# Patient Record
Sex: MALE | Race: OTHER | HISPANIC OR LATINO | URBAN - METROPOLITAN AREA
[De-identification: names, ages, dates, MRNs, and addresses within clinical notes are randomized per-mention and may not be internally consistent; named-entity substitution may affect disease eponyms.]

---

## 2022-05-02 ENCOUNTER — EMERGENCY (EMERGENCY)
Facility: HOSPITAL | Age: 72
LOS: 1 days | Discharge: HOME | End: 2022-05-02
Attending: EMERGENCY MEDICINE | Admitting: EMERGENCY MEDICINE
Payer: MEDICARE

## 2022-05-02 VITALS
RESPIRATION RATE: 14 BRPM | SYSTOLIC BLOOD PRESSURE: 145 MMHG | OXYGEN SATURATION: 100 % | TEMPERATURE: 98 F | HEART RATE: 67 BPM | DIASTOLIC BLOOD PRESSURE: 50 MMHG

## 2022-05-02 PROCEDURE — 93010 ELECTROCARDIOGRAM REPORT: CPT

## 2022-05-02 PROCEDURE — 99285 EMERGENCY DEPT VISIT HI MDM: CPT | Mod: 25

## 2022-05-02 NOTE — ED ADULT TRIAGE NOTE - CHIEF COMPLAINT QUOTE
presents C/O lower abdominal pain dysuria x10 days. No complaints of  hematuria. chest pain, headache, nausea, dizziness, vomiting  SOB, fever, chills verbalized. PMhx DM

## 2022-05-03 VITALS
TEMPERATURE: 99 F | OXYGEN SATURATION: 100 % | HEART RATE: 53 BPM | SYSTOLIC BLOOD PRESSURE: 108 MMHG | RESPIRATION RATE: 16 BRPM | DIASTOLIC BLOOD PRESSURE: 59 MMHG

## 2022-05-03 DIAGNOSIS — Z98.890 OTHER SPECIFIED POSTPROCEDURAL STATES: Chronic | ICD-10-CM

## 2022-05-03 LAB
ALBUMIN SERPL ELPH-MCNC: 3.6 G/DL — SIGNIFICANT CHANGE UP (ref 3.3–5)
ALP SERPL-CCNC: 79 U/L — SIGNIFICANT CHANGE UP (ref 40–120)
ALT FLD-CCNC: 19 U/L — SIGNIFICANT CHANGE UP (ref 4–41)
ANION GAP SERPL CALC-SCNC: 12 MMOL/L — SIGNIFICANT CHANGE UP (ref 7–14)
APPEARANCE UR: CLEAR — SIGNIFICANT CHANGE UP
AST SERPL-CCNC: 20 U/L — SIGNIFICANT CHANGE UP (ref 4–40)
BASOPHILS # BLD AUTO: 0.03 K/UL — SIGNIFICANT CHANGE UP (ref 0–0.2)
BASOPHILS NFR BLD AUTO: 0.5 % — SIGNIFICANT CHANGE UP (ref 0–2)
BILIRUB SERPL-MCNC: 0.5 MG/DL — SIGNIFICANT CHANGE UP (ref 0.2–1.2)
BILIRUB UR-MCNC: NEGATIVE — SIGNIFICANT CHANGE UP
BLOOD GAS VENOUS COMPREHENSIVE RESULT: SIGNIFICANT CHANGE UP
BUN SERPL-MCNC: 16 MG/DL — SIGNIFICANT CHANGE UP (ref 7–23)
CALCIUM SERPL-MCNC: 8.7 MG/DL — SIGNIFICANT CHANGE UP (ref 8.4–10.5)
CHLORIDE SERPL-SCNC: 104 MMOL/L — SIGNIFICANT CHANGE UP (ref 98–107)
CO2 SERPL-SCNC: 23 MMOL/L — SIGNIFICANT CHANGE UP (ref 22–31)
COLOR SPEC: YELLOW — SIGNIFICANT CHANGE UP
CREAT SERPL-MCNC: 0.99 MG/DL — SIGNIFICANT CHANGE UP (ref 0.5–1.3)
DIFF PNL FLD: NEGATIVE — SIGNIFICANT CHANGE UP
EGFR: 80 ML/MIN/1.73M2 — SIGNIFICANT CHANGE UP
EOSINOPHIL # BLD AUTO: 0.98 K/UL — HIGH (ref 0–0.5)
EOSINOPHIL NFR BLD AUTO: 17 % — HIGH (ref 0–6)
GLUCOSE SERPL-MCNC: 129 MG/DL — HIGH (ref 70–99)
GLUCOSE UR QL: NEGATIVE — SIGNIFICANT CHANGE UP
HCT VFR BLD CALC: 42 % — SIGNIFICANT CHANGE UP (ref 39–50)
HGB BLD-MCNC: 14.7 G/DL — SIGNIFICANT CHANGE UP (ref 13–17)
IANC: 1.87 K/UL — SIGNIFICANT CHANGE UP (ref 1.8–7.4)
IMM GRANULOCYTES NFR BLD AUTO: 0.5 % — SIGNIFICANT CHANGE UP (ref 0–1.5)
KETONES UR-MCNC: NEGATIVE — SIGNIFICANT CHANGE UP
LEUKOCYTE ESTERASE UR-ACNC: NEGATIVE — SIGNIFICANT CHANGE UP
LIDOCAIN IGE QN: 20 U/L — SIGNIFICANT CHANGE UP (ref 7–60)
LYMPHOCYTES # BLD AUTO: 2.03 K/UL — SIGNIFICANT CHANGE UP (ref 1–3.3)
LYMPHOCYTES # BLD AUTO: 35.2 % — SIGNIFICANT CHANGE UP (ref 13–44)
MCHC RBC-ENTMCNC: 33.9 PG — SIGNIFICANT CHANGE UP (ref 27–34)
MCHC RBC-ENTMCNC: 35 GM/DL — SIGNIFICANT CHANGE UP (ref 32–36)
MCV RBC AUTO: 97 FL — SIGNIFICANT CHANGE UP (ref 80–100)
MONOCYTES # BLD AUTO: 0.82 K/UL — SIGNIFICANT CHANGE UP (ref 0–0.9)
MONOCYTES NFR BLD AUTO: 14.2 % — HIGH (ref 2–14)
NEUTROPHILS # BLD AUTO: 1.87 K/UL — SIGNIFICANT CHANGE UP (ref 1.8–7.4)
NEUTROPHILS NFR BLD AUTO: 32.6 % — LOW (ref 43–77)
NITRITE UR-MCNC: NEGATIVE — SIGNIFICANT CHANGE UP
NRBC # BLD: 0 /100 WBCS — SIGNIFICANT CHANGE UP
NRBC # FLD: 0 K/UL — SIGNIFICANT CHANGE UP
NT-PROBNP SERPL-SCNC: 149 PG/ML — SIGNIFICANT CHANGE UP
PH UR: 6 — SIGNIFICANT CHANGE UP (ref 5–8)
PLATELET # BLD AUTO: 160 K/UL — SIGNIFICANT CHANGE UP (ref 150–400)
POTASSIUM SERPL-MCNC: 4 MMOL/L — SIGNIFICANT CHANGE UP (ref 3.5–5.3)
POTASSIUM SERPL-SCNC: 4 MMOL/L — SIGNIFICANT CHANGE UP (ref 3.5–5.3)
PROT SERPL-MCNC: 6.8 G/DL — SIGNIFICANT CHANGE UP (ref 6–8.3)
PROT UR-MCNC: ABNORMAL
RBC # BLD: 4.33 M/UL — SIGNIFICANT CHANGE UP (ref 4.2–5.8)
RBC # FLD: 12.5 % — SIGNIFICANT CHANGE UP (ref 10.3–14.5)
SARS-COV-2 RNA SPEC QL NAA+PROBE: SIGNIFICANT CHANGE UP
SODIUM SERPL-SCNC: 139 MMOL/L — SIGNIFICANT CHANGE UP (ref 135–145)
SP GR SPEC: 1.03 — SIGNIFICANT CHANGE UP (ref 1–1.05)
TROPONIN T, HIGH SENSITIVITY RESULT: 9 NG/L — SIGNIFICANT CHANGE UP
UROBILINOGEN FLD QL: ABNORMAL
WBC # BLD: 5.76 K/UL — SIGNIFICANT CHANGE UP (ref 3.8–10.5)
WBC # FLD AUTO: 5.76 K/UL — SIGNIFICANT CHANGE UP (ref 3.8–10.5)

## 2022-05-03 PROCEDURE — 71045 X-RAY EXAM CHEST 1 VIEW: CPT | Mod: 26

## 2022-05-03 PROCEDURE — 74177 CT ABD & PELVIS W/CONTRAST: CPT | Mod: 26,MA

## 2022-05-03 RX ORDER — METRONIDAZOLE 500 MG
1 TABLET ORAL
Qty: 21 | Refills: 0
Start: 2022-05-03 | End: 2022-05-09

## 2022-05-03 RX ORDER — CEFTRIAXONE 500 MG/1
1000 INJECTION, POWDER, FOR SOLUTION INTRAMUSCULAR; INTRAVENOUS ONCE
Refills: 0 | Status: COMPLETED | OUTPATIENT
Start: 2022-05-03 | End: 2022-05-03

## 2022-05-03 RX ORDER — METRONIDAZOLE 500 MG
500 TABLET ORAL ONCE
Refills: 0 | Status: COMPLETED | OUTPATIENT
Start: 2022-05-03 | End: 2022-05-03

## 2022-05-03 RX ORDER — SODIUM CHLORIDE 9 MG/ML
1000 INJECTION INTRAMUSCULAR; INTRAVENOUS; SUBCUTANEOUS ONCE
Refills: 0 | Status: COMPLETED | OUTPATIENT
Start: 2022-05-03 | End: 2022-05-03

## 2022-05-03 RX ORDER — CEFPODOXIME PROXETIL 100 MG
1 TABLET ORAL
Qty: 14 | Refills: 0
Start: 2022-05-03 | End: 2022-05-09

## 2022-05-03 RX ORDER — ONDANSETRON 8 MG/1
4 TABLET, FILM COATED ORAL ONCE
Refills: 0 | Status: COMPLETED | OUTPATIENT
Start: 2022-05-03 | End: 2022-05-03

## 2022-05-03 RX ORDER — FAMOTIDINE 10 MG/ML
20 INJECTION INTRAVENOUS ONCE
Refills: 0 | Status: COMPLETED | OUTPATIENT
Start: 2022-05-03 | End: 2022-05-03

## 2022-05-03 RX ADMIN — ONDANSETRON 4 MILLIGRAM(S): 8 TABLET, FILM COATED ORAL at 02:01

## 2022-05-03 RX ADMIN — FAMOTIDINE 20 MILLIGRAM(S): 10 INJECTION INTRAVENOUS at 02:01

## 2022-05-03 RX ADMIN — CEFTRIAXONE 100 MILLIGRAM(S): 500 INJECTION, POWDER, FOR SOLUTION INTRAMUSCULAR; INTRAVENOUS at 03:20

## 2022-05-03 RX ADMIN — SODIUM CHLORIDE 1000 MILLILITER(S): 9 INJECTION INTRAMUSCULAR; INTRAVENOUS; SUBCUTANEOUS at 01:19

## 2022-05-03 RX ADMIN — Medication 100 MILLIGRAM(S): at 06:46
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
## 2022-05-03 NOTE — ED PROVIDER NOTE - NSFOLLOWUPINSTRUCTIONS_ED_ALL_ED_FT
Rest and stay well hydrated.     Follow-up with your PMD this week for recheck - call to discuss.     Follow-up with Surgery and Gastroenterology for further evaluation of your diverticulitis.     Take antibiotics as prescribed - see medication warnings.     Finish your course of ciprofloxacin as directed.     Return to the ED for any worsening abdominal pain, blood in stool, vomiting, fever, or any new concerning symptoms.
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
## 2022-05-03 NOTE — ED PROVIDER NOTE - NSFOLLOWUPCLINICSTOKEN_GEN_ALL_ED_FT
[id number]: || ||00\01||False;[id number]: || ||00\01||False;[id number]: || ||00\01||False;[id number]: || ||00\01||False;[id number]: || ||00\01||False;[id number]: || ||00\01||False;[id number]: || ||00\01||False;

## 2022-05-03 NOTE — ED PROVIDER NOTE - CARE PLAN
1 Principal Discharge DX:	Abdominal pain   Principal Discharge DX:	Acute UTI  Secondary Diagnosis:	Diverticulitis

## 2022-05-03 NOTE — ED ADULT NURSE NOTE - OBJECTIVE STATEMENT
Patient came in with the complaints of intermittent Lower abdominal pain with dysuria X 10 days. Patient denies hematuria. No other complaints. Specimens collected and sent. No distress noted. Nursing care continues

## 2022-05-03 NOTE — ED PROVIDER NOTE - PATIENT PORTAL LINK FT
You can access the FollowMyHealth Patient Portal offered by [facility] by registering at the following website: [email or web address]. By joining [employer]’s FollowMyHealth portal, you will also be able to view your health information using other applications (apps) compatible with our system.

## 2022-05-03 NOTE — ED PROVIDER NOTE - PROGRESS NOTE DETAILS
Ford, PGY3 - CT w/ mild sigmoid diverticulitis. pt reassessed, states feels better, discussed labs and imaging results w/ pt and daughter - plan for dc home, f/u w/ GI and surgery, antibiotics, return precautions. both agree with plan all questions answered

## 2022-05-03 NOTE — ED PROVIDER NOTE - PHYSICAL EXAMINATION
Gen: well developed male NAD   HEENT: NCAT, EOMI, no nasal discharge, mucous membranes moist  CV: RRR, +S1/S2, no M/R/G  Resp: CTAB, no W/R/R  GI: Abdomen soft non-distended +TTP LLQ no rebound rigidity guarding or masses   : no erythema edema or asymmetry to b/l testicles no lesions or masses no rash to penis   MSK: No open wounds, no bruising, no LE edema  Neuro: A&Ox4, following commands, moving all four extremities spontaneously  Psych: appropriate mood

## 2022-05-03 NOTE — ED PROVIDER NOTE - NSFOLLOWUPCLINICS_GEN_ALL_ED_FT
A Gastroenterologist  Gastroenterology  .  [place]   Phone:   Fax:     A General Surgeon  General Surgery  .  [place]   Phone:   Fax:     [facility] - Primary Care  Primary Care  [address] [place] [address]  Phone: [phone]  Fax:     [facility] Gastroenterology  Gastroenterology  [address], [address]  [place] [address]  Phone: [phone]  Fax:     [facility] Specialty Clinics  General Surgery  [address] [place] [address]  Phone: [phone]  Fax:     An ENT Doctor  Otolaryngology (ENT)  .  [place]   Phone:   Fax:     [facility] - ENT  Otolaryngology (ENT)  [address] [place] [address]  Phone: [phone]  Fax:

## 2022-05-04 LAB
CULTURE RESULTS: SIGNIFICANT CHANGE UP
SPECIMEN SOURCE: SIGNIFICANT CHANGE UP

## 2022-05-05 NOTE — ED POST DISCHARGE NOTE - REASON FOR FOLLOW-UP
Other Received a call from [facility] asking for patient's COVID-19 results which were negative.

## 2022-07-27 PROBLEM — E11.9 TYPE 2 DIABETES MELLITUS WITHOUT COMPLICATIONS: Chronic | Status: ACTIVE | Noted: 2022-05-03

## 2022-07-28 PROBLEM — Z00.00 ENCOUNTER FOR PREVENTIVE HEALTH EXAMINATION: Status: ACTIVE | Noted: 2022-07-28

## 2022-08-01 ENCOUNTER — APPOINTMENT (OUTPATIENT)
Dept: NEUROSURGERY | Facility: CLINIC | Age: 72
End: 2022-08-01

## 2022-08-01 DIAGNOSIS — R13.10 DYSPHAGIA, UNSPECIFIED: ICD-10-CM

## 2022-08-01 DIAGNOSIS — R13.12 DYSPHAGIA, OROPHARYNGEAL PHASE: ICD-10-CM

## 2022-08-01 DIAGNOSIS — E78.5 HYPERLIPIDEMIA, UNSPECIFIED: ICD-10-CM

## 2022-08-01 DIAGNOSIS — S39.91XA UNSPECIFIED INJURY OF ABDOMEN, INITIAL ENCOUNTER: ICD-10-CM

## 2022-08-01 DIAGNOSIS — M25.78 OSTEOPHYTE, VERTEBRAE: ICD-10-CM

## 2022-08-01 DIAGNOSIS — Z86.79 PERSONAL HISTORY OF OTHER DISEASES OF THE CIRCULATORY SYSTEM: ICD-10-CM

## 2022-08-01 DIAGNOSIS — Z78.9 OTHER SPECIFIED HEALTH STATUS: ICD-10-CM

## 2022-08-01 PROCEDURE — 99204 OFFICE O/P NEW MOD 45 MIN: CPT

## 2022-08-04 NOTE — REASON FOR VISIT
[New Patient Visit] : a new patient visit [Referred By: _________] : Patient was referred by RAFIA [Interpreters_IDNumber] : [id number] [Interpreters_FullName] : CHIARA [TWNoteComboBox1] : Micronesian

## 2022-08-04 NOTE — ASSESSMENT
[FreeTextEntry1] : Mr. London Farias is a 72 Year old Man presenting with Cervical osteophytes and dysphagia\par Dr. Larkin explained that pt has advance arthritis that is compression the esophagus causing difficulty swallowing\par Recommend to see SLP to improve symptoms without surgical interventions.\par Follow up with  regarding Left hearing issues.\par \par Please see Dr. Larkin's dictation for details.\par I, Dr. César Larkin evaluated the patient with the nurse practitioner Christiano Mcallister and established the plan of care. I personally discuss this patient with the nurse practitioner at the time of the visit. I agree with the assessment and plan as written, unless noted below.\par \par

## 2022-08-04 NOTE — PHYSICAL EXAM
[General Appearance - Alert] : alert [General Appearance - In No Acute Distress] : in no acute distress [Oriented To Time, Place, And Person] : oriented to person, place, and time [Impaired Insight] : insight and judgment were intact [No Visual Abnormalities] : no visible abnormalities [Sclera] : the sclera and conjunctiva were normal [Neck Appearance] : the appearance of the neck was normal [] : no respiratory distress [Heart Rate And Rhythm] : heart rate was normal and rhythm regular [Abnormal Walk] : normal gait [FreeTextEntry1] : Swallowing difficulty

## 2022-08-04 NOTE — HISTORY OF PRESENT ILLNESS
[> 3 months] : more  than 3 months [FreeTextEntry1] : Difficulty swallowing X 1 year [de-identified] : Mr. London Farias is a 72  year old Swedish speaking man presenting with PMHx of HTN, dyslipidemia, dysphasia  who presents for evaluation of cervical spine. He comes for comprehensive neurosurgical evaluation after comprehensive workup. He underwent comprehensive ENT workup. He reports Neck pain on the right side worse in morning. He denies any hand weakness hearing loss left side. He reports significant intense neck pain occurring several times per month. He reports ongoing swallowing difficulty. He denies any weakness in his upper extremities.

## 2023-10-01 ENCOUNTER — EMERGENCY (EMERGENCY)
Facility: HOSPITAL | Age: 73
LOS: 1 days | Discharge: HOME | End: 2023-10-01
Attending: STUDENT IN AN ORGANIZED HEALTH CARE EDUCATION/TRAINING PROGRAM
Payer: COMMERCIAL

## 2023-10-01 VITALS
HEIGHT: 65 IN | DIASTOLIC BLOOD PRESSURE: 75 MMHG | TEMPERATURE: 98 F | RESPIRATION RATE: 20 BRPM | OXYGEN SATURATION: 97 % | HEART RATE: 68 BPM | SYSTOLIC BLOOD PRESSURE: 154 MMHG | WEIGHT: 156.09 LBS

## 2023-10-01 DIAGNOSIS — Z98.890 OTHER SPECIFIED POSTPROCEDURAL STATES: Chronic | ICD-10-CM

## 2023-10-01 PROCEDURE — 99284 EMERGENCY DEPT VISIT MOD MDM: CPT

## 2023-10-01 PROCEDURE — 96372 THER/PROPH/DIAG INJ SC/IM: CPT

## 2023-10-01 PROCEDURE — 99283 EMERGENCY DEPT VISIT LOW MDM: CPT | Mod: 25

## 2023-10-01 RX ORDER — LIDOCAINE 4 G/100G
1 CREAM TOPICAL ONCE
Refills: 0 | Status: COMPLETED | OUTPATIENT
Start: 2023-10-01 | End: 2023-10-01

## 2023-10-01 RX ORDER — ACETAMINOPHEN 500 MG
975 TABLET ORAL ONCE
Refills: 0 | Status: COMPLETED | OUTPATIENT
Start: 2023-10-01 | End: 2023-10-01

## 2023-10-01 RX ORDER — KETOROLAC TROMETHAMINE 30 MG/ML
30 SYRINGE (ML) INJECTION ONCE
Refills: 0 | Status: DISCONTINUED | OUTPATIENT
Start: 2023-10-01 | End: 2023-10-01

## 2023-10-01 RX ORDER — CYCLOBENZAPRINE HYDROCHLORIDE 10 MG/1
10 TABLET, FILM COATED ORAL ONCE
Refills: 0 | Status: COMPLETED | OUTPATIENT
Start: 2023-10-01 | End: 2023-10-01

## 2023-10-01 RX ADMIN — Medication 975 MILLIGRAM(S): at 17:49

## 2023-10-01 RX ADMIN — LIDOCAINE 1 PATCH: 4 CREAM TOPICAL at 17:49

## 2023-10-01 RX ADMIN — Medication 30 MILLIGRAM(S): at 17:49

## 2023-10-01 RX ADMIN — CYCLOBENZAPRINE HYDROCHLORIDE 10 MILLIGRAM(S): 10 TABLET, FILM COATED ORAL at 17:49

## 2023-10-01 NOTE — ED PROVIDER NOTE - CLINICAL SUMMARY MEDICAL DECISION MAKING FREE TEXT BOX
73M presenting with lower back pain. no red flag symptoms, no midline spinal tenderness. had trauma prior to symptoms beginning. low concern for cauda equina syndrome, spinal lesions. likely msk pain vs radiculopathy. will provide analgesia.

## 2023-10-01 NOTE — ED PROVIDER NOTE - PATIENT PORTAL LINK FT
You can access the FollowMyHealth Patient Portal offered by [facility] by registering at the following website: [email or web address]. By joining [email or web address]’s FollowMyHealth portal, you will also be able to view your health information using other applications (apps) compatible with our system.

## 2023-10-01 NOTE — ED PROVIDER NOTE - NSFOLLOWUPINSTRUCTIONS_ED_ALL_ED_FT
You were seen in the emergency department for lower back pain.     Please follow-up with your primary care doctor in the next 24-48 hours.     Please take Ibuprofen and tylenol as prescribed on the bottles for pain control.     If you have any worsening symptoms, severe back pain, leg weakness, numbness or tingling please return to the emergency department. Le atendieron en urgencias por dolor lumbar.    Chery un seguimiento con valienet médico de atención primaria en las próximas 24 a 48 horas.    McCord ibuprofeno y tylenol según lo prescrito en los frascos para controlar el dolor.    Si tiene algún síntoma que empeora, dolor de espalda intenso, debilidad en las piernas, entumecimiento u hormigueo, regrese al departamento de emergencias.    Translation via Google Translate. Cannot guarantee accuracy.     You were seen in the emergency department for lower back pain.     Please follow-up with your primary care doctor in the next 24-48 hours.     Please take Ibuprofen and tylenol as prescribed on the bottles for pain control.     If you have any worsening symptoms, severe back pain, leg weakness, numbness or tingling please return to the emergency department.

## 2023-10-01 NOTE — ED ADULT NURSE NOTE - NSFALLUNIVINTERV_ED_ALL_ED
Bed/Stretcher in lowest position, wheels locked, appropriate side rails in place/Call bell, personal items and telephone in reach/Instruct patient to call for assistance before getting out of bed/chair/stretcher/Non-slip footwear applied when patient is off stretcher/[place] to call system/Physically safe environment - no spills, clutter or unnecessary equipment/Purposeful proactive rounding/Room/bathroom lighting operational, light cord in reach

## 2023-10-01 NOTE — ED ADULT NURSE NOTE - OBJECTIVE STATEMENT
pt is here for back pain.  pt stated that back pain x 20 days, difficulty ambulating, denies difficulty with urination or burning sensation, denies chest pain or sob,

## 2023-10-01 NOTE — ED PROVIDER NOTE - PHYSICAL EXAMINATION
General: well appearing male, no acute distress   HEENT: normocephalic, atraumatic   Respiratory: normal work of breathing  MSK: no midline spinal tenderness to palpation, right lumbar paraspinal tenderness to palpation   Skin: warm, dry   Neuro: A&Ox3, positive left straight leg raise   Psych: appropriate affect

## 2023-10-01 NOTE — ED PROVIDER NOTE - OBJECTIVE STATEMENT
73M, pmh of htn, DM, presenting with back pain. patient reports for the past two weeks he has had right sided back pain that is worse with movement. reports maybe a week prior he fell onto his back. denies any numbness, numbness or tingling in his legs.

## 2024-03-07 ENCOUNTER — INPATIENT (INPATIENT)
Facility: HOSPITAL | Age: 74
LOS: 2 days | Discharge: HOME HEALTH | End: 2024-03-10
Attending: HOSPITALIST | Admitting: HOSPITALIST
Payer: MEDICARE

## 2024-03-07 VITALS
SYSTOLIC BLOOD PRESSURE: 161 MMHG | RESPIRATION RATE: 18 BRPM | OXYGEN SATURATION: 97 % | HEART RATE: 72 BPM | TEMPERATURE: 98 F | DIASTOLIC BLOOD PRESSURE: 64 MMHG

## 2024-03-07 DIAGNOSIS — Z98.890 OTHER SPECIFIED POSTPROCEDURAL STATES: Chronic | ICD-10-CM

## 2024-03-07 LAB
ADD ON TEST-SPECIMEN IN LAB: SIGNIFICANT CHANGE UP
ALBUMIN SERPL ELPH-MCNC: 4 G/DL — SIGNIFICANT CHANGE UP (ref 3.3–5)
ALP SERPL-CCNC: 73 U/L — SIGNIFICANT CHANGE UP (ref 40–120)
ALT FLD-CCNC: 14 U/L — SIGNIFICANT CHANGE UP (ref 4–41)
ANION GAP SERPL CALC-SCNC: 13 MMOL/L — SIGNIFICANT CHANGE UP (ref 7–14)
AST SERPL-CCNC: 17 U/L — SIGNIFICANT CHANGE UP (ref 4–40)
BASOPHILS # BLD AUTO: 0.04 K/UL — SIGNIFICANT CHANGE UP (ref 0–0.2)
BASOPHILS NFR BLD AUTO: 0.4 % — SIGNIFICANT CHANGE UP (ref 0–2)
BILIRUB SERPL-MCNC: 1.1 MG/DL — SIGNIFICANT CHANGE UP (ref 0.2–1.2)
BUN SERPL-MCNC: 10 MG/DL — SIGNIFICANT CHANGE UP (ref 7–23)
CALCIUM SERPL-MCNC: 9 MG/DL — SIGNIFICANT CHANGE UP (ref 8.4–10.5)
CHLORIDE SERPL-SCNC: 99 MMOL/L — SIGNIFICANT CHANGE UP (ref 98–107)
CO2 SERPL-SCNC: 27 MMOL/L — SIGNIFICANT CHANGE UP (ref 22–31)
CREAT SERPL-MCNC: 0.96 MG/DL — SIGNIFICANT CHANGE UP (ref 0.5–1.3)
EGFR: 82 ML/MIN/1.73M2 — SIGNIFICANT CHANGE UP
EOSINOPHIL # BLD AUTO: 0.31 K/UL — SIGNIFICANT CHANGE UP (ref 0–0.5)
EOSINOPHIL NFR BLD AUTO: 2.8 % — SIGNIFICANT CHANGE UP (ref 0–6)
GLUCOSE SERPL-MCNC: 132 MG/DL — HIGH (ref 70–99)
HCT VFR BLD CALC: 43.1 % — SIGNIFICANT CHANGE UP (ref 39–50)
HGB BLD-MCNC: 15 G/DL — SIGNIFICANT CHANGE UP (ref 13–17)
IANC: 7.21 K/UL — SIGNIFICANT CHANGE UP (ref 1.8–7.4)
IMM GRANULOCYTES NFR BLD AUTO: 0.5 % — SIGNIFICANT CHANGE UP (ref 0–0.9)
LYMPHOCYTES # BLD AUTO: 19.8 % — SIGNIFICANT CHANGE UP (ref 13–44)
LYMPHOCYTES # BLD AUTO: 2.17 K/UL — SIGNIFICANT CHANGE UP (ref 1–3.3)
MCHC RBC-ENTMCNC: 33.2 PG — SIGNIFICANT CHANGE UP (ref 27–34)
MCHC RBC-ENTMCNC: 34.8 GM/DL — SIGNIFICANT CHANGE UP (ref 32–36)
MCV RBC AUTO: 95.4 FL — SIGNIFICANT CHANGE UP (ref 80–100)
MONOCYTES # BLD AUTO: 1.2 K/UL — HIGH (ref 0–0.9)
MONOCYTES NFR BLD AUTO: 10.9 % — SIGNIFICANT CHANGE UP (ref 2–14)
NEUTROPHILS # BLD AUTO: 7.21 K/UL — SIGNIFICANT CHANGE UP (ref 1.8–7.4)
NEUTROPHILS NFR BLD AUTO: 65.6 % — SIGNIFICANT CHANGE UP (ref 43–77)
NRBC # BLD: 0 /100 WBCS — SIGNIFICANT CHANGE UP (ref 0–0)
NRBC # FLD: 0 K/UL — SIGNIFICANT CHANGE UP (ref 0–0)
NT-PROBNP SERPL-SCNC: 180 PG/ML — SIGNIFICANT CHANGE UP
PLATELET # BLD AUTO: 141 K/UL — LOW (ref 150–400)
POTASSIUM SERPL-MCNC: 3.9 MMOL/L — SIGNIFICANT CHANGE UP (ref 3.5–5.3)
POTASSIUM SERPL-SCNC: 3.9 MMOL/L — SIGNIFICANT CHANGE UP (ref 3.5–5.3)
PROT SERPL-MCNC: 7.6 G/DL — SIGNIFICANT CHANGE UP (ref 6–8.3)
RBC # BLD: 4.52 M/UL — SIGNIFICANT CHANGE UP (ref 4.2–5.8)
RBC # FLD: 12.7 % — SIGNIFICANT CHANGE UP (ref 10.3–14.5)
SODIUM SERPL-SCNC: 139 MMOL/L — SIGNIFICANT CHANGE UP (ref 135–145)
TROPONIN T, HIGH SENSITIVITY RESULT: 13 NG/L — SIGNIFICANT CHANGE UP
WBC # BLD: 10.98 K/UL — HIGH (ref 3.8–10.5)
WBC # FLD AUTO: 10.98 K/UL — HIGH (ref 3.8–10.5)

## 2024-03-07 PROCEDURE — 99285 EMERGENCY DEPT VISIT HI MDM: CPT

## 2024-03-07 PROCEDURE — 71046 X-RAY EXAM CHEST 2 VIEWS: CPT | Mod: 26

## 2024-03-07 RX ORDER — ACETAMINOPHEN 500 MG
1000 TABLET ORAL ONCE
Refills: 0 | Status: COMPLETED | OUTPATIENT
Start: 2024-03-07 | End: 2024-03-07

## 2024-03-07 RX ORDER — FAMOTIDINE 10 MG/ML
20 INJECTION INTRAVENOUS ONCE
Refills: 0 | Status: COMPLETED | OUTPATIENT
Start: 2024-03-07 | End: 2024-03-07

## 2024-03-07 RX ORDER — IOHEXOL 300 MG/ML
30 INJECTION, SOLUTION INTRAVENOUS ONCE
Refills: 0 | Status: DISCONTINUED | OUTPATIENT
Start: 2024-03-07 | End: 2024-03-07

## 2024-03-07 RX ADMIN — FAMOTIDINE 20 MILLIGRAM(S): 10 INJECTION INTRAVENOUS at 22:08

## 2024-03-07 RX ADMIN — Medication 30 MILLILITER(S): at 22:07

## 2024-03-07 RX ADMIN — Medication 400 MILLIGRAM(S): at 21:36

## 2024-03-07 NOTE — ED ADULT NURSE NOTE - OBJECTIVE STATEMENT
Martinez RN: Pt arrives to room 2. Pt is A and Ox4 and ambulatory. HX: DM type 2, HTN, HLD. Pt arrives to the ED complaining of chest pain and abdominal pain for four days. Pt states that the pain does not travel and is a little worse on exertion. Pt denies shortness of breath, headaches, numbness and tingling, n/v, fever and chills. Airway is patent, respirations are even and unlabored. IV placed in the L AC. Pt medicated per MAR. Labs sent per provider orders. Report given to primary RN.

## 2024-03-07 NOTE — ED ADULT TRIAGE NOTE - CHIEF COMPLAINT QUOTE
Patient complains of left sided chest pain, neck pain and abdominal pain x4 days. Patient also complains of SOB and chills, denies fever, nausea, vomiting and constipation. Patient able to speak in clear sentences, respirations equal and unlabored. PMHx of DM2, hernia and HLD.

## 2024-03-07 NOTE — ED PROVIDER NOTE - PROGRESS NOTE DETAILS
Patient signed out to me, CT showing aortic thrombus.  Spoke to Dr. Lipscomb (cardiac surgery)  no indication for procedural or surgical intervention at this time given CT the patient's symptoms.  Recommended heparinization and admission to medicine.  Patient updated on findings.  Weight obtained. Will obtain head CT prior to starting heparin.   Patient complaining of persistent abdominal pain,  no actionable findings on CT.  Possible inflamed pancreas however lipase normal.  Will order for morphine.  Plan for admission. Fernandez Reed, ED Attending
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
## 2024-03-07 NOTE — ED PROVIDER NOTE - ATTENDING CONTRIBUTION TO CARE
73-year-old male past medical history hypertension, edema, diabetes, CVA presenting with daughter at bedside for chest and abdominal pain for the past 3 days.  Reports chest pain pressure-like constant nonradiating with associated shortness of breath.  Denies diaphoresis nausea vomiting.  Reports symptoms worse with exertion.  Also reports nonfocal abdominal pain without constipation, diarrhea, dysuria hematuria.  Denies any new back pain, extremity swelling, extremity pain, numbness, weakness.  Exam as above  Unclear etiology of symptoms, differential diagnosis includes, but not limited to, CAD, pneumonia, aortic pathology, colitis, bowel obstruction.  Labs, CTA, symptom relief, reassess.  Patient signed out at end of my shift.

## 2024-03-07 NOTE — ED PROVIDER NOTE - OBJECTIVE STATEMENT
73-year-old male with a past medical history of hypertension, hyperlipidemia, diabetes, CVA presenting with chest pain. Patient states he has had chest pain and abdominal pain for the last 3 days. Chest pain feels like pressure, constant, nonradiating. Associated shortness of breath, worse with exertion. Patient is also had generalized abdominal pain for the last 3 days as well. Denies nausea or vomiting, fevers.

## 2024-03-07 NOTE — ED ADULT TRIAGE NOTE - LANGUAGE ASSISTANCE NEEDED
DISCHARGE PLANNING     Discharge to home or other facility with appropriate resources Progressing        DISCHARGE PLANNING - CARE MANAGEMENT     Discharge to post-acute care or home with appropriate resources Progressing        INFECTION - ADULT     Absence or prevention of progression during hospitalization Progressing     Absence of fever/infection during neutropenic period Progressing        Knowledge Deficit     Patient/family/caregiver demonstrates understanding of disease process, treatment plan, medications, and discharge instructions Progressing        PAIN - ADULT     Verbalizes/displays adequate comfort level or baseline comfort level Progressing        POSTPARTUM     Experiences normal postpartum course Progressing     Appropriate maternal -  bonding Progressing     Establishment of infant feeding pattern Progressing     Incision(s), wounds(s) or drain site(s) healing without S/S of infection Progressing        SAFETY ADULT     Patient will remain free of falls Progressing     Maintain or return to baseline ADL function Progressing     Maintain or return mobility status to optimal level Progressing No-Patient/Caregiver offered and refused free interpretation services.

## 2024-03-08 DIAGNOSIS — I74.11 EMBOLISM AND THROMBOSIS OF THORACIC AORTA: ICD-10-CM

## 2024-03-08 DIAGNOSIS — R10.9 UNSPECIFIED ABDOMINAL PAIN: ICD-10-CM

## 2024-03-08 DIAGNOSIS — R07.9 CHEST PAIN, UNSPECIFIED: ICD-10-CM

## 2024-03-08 DIAGNOSIS — E11.9 TYPE 2 DIABETES MELLITUS WITHOUT COMPLICATIONS: ICD-10-CM

## 2024-03-08 DIAGNOSIS — E78.5 HYPERLIPIDEMIA, UNSPECIFIED: ICD-10-CM

## 2024-03-08 DIAGNOSIS — K29.70 GASTRITIS, UNSPECIFIED, WITHOUT BLEEDING: ICD-10-CM

## 2024-03-08 DIAGNOSIS — I63.9 CEREBRAL INFARCTION, UNSPECIFIED: ICD-10-CM

## 2024-03-08 DIAGNOSIS — I10 ESSENTIAL (PRIMARY) HYPERTENSION: ICD-10-CM

## 2024-03-08 DIAGNOSIS — Z29.9 ENCOUNTER FOR PROPHYLACTIC MEASURES, UNSPECIFIED: ICD-10-CM

## 2024-03-08 DIAGNOSIS — I74.10 EMBOLISM AND THROMBOSIS OF UNSPECIFIED PARTS OF AORTA: ICD-10-CM

## 2024-03-08 LAB
ALBUMIN SERPL ELPH-MCNC: 3.6 G/DL — SIGNIFICANT CHANGE UP (ref 3.3–5)
ALP SERPL-CCNC: 68 U/L — SIGNIFICANT CHANGE UP (ref 40–120)
ALT FLD-CCNC: 11 U/L — SIGNIFICANT CHANGE UP (ref 4–41)
AMYLASE P1 CFR SERPL: 52 U/L — SIGNIFICANT CHANGE UP (ref 25–125)
ANION GAP SERPL CALC-SCNC: 10 MMOL/L — SIGNIFICANT CHANGE UP (ref 7–14)
ANION GAP SERPL CALC-SCNC: 10 MMOL/L — SIGNIFICANT CHANGE UP (ref 7–14)
APPEARANCE UR: CLEAR — SIGNIFICANT CHANGE UP
APTT BLD: 28.8 SEC — SIGNIFICANT CHANGE UP (ref 24.5–35.6)
APTT BLD: 66.8 SEC — HIGH (ref 24.5–35.6)
APTT BLD: 75 SEC — HIGH (ref 24.5–35.6)
AST SERPL-CCNC: 14 U/L — SIGNIFICANT CHANGE UP (ref 4–40)
BACTERIA # UR AUTO: NEGATIVE /HPF — SIGNIFICANT CHANGE UP
BASE EXCESS BLDV CALC-SCNC: 5 MMOL/L — HIGH (ref -2–3)
BILIRUB SERPL-MCNC: 1.1 MG/DL — SIGNIFICANT CHANGE UP (ref 0.2–1.2)
BILIRUB UR-MCNC: NEGATIVE — SIGNIFICANT CHANGE UP
BLOOD GAS VENOUS COMPREHENSIVE RESULT: SIGNIFICANT CHANGE UP
BUN SERPL-MCNC: 12 MG/DL — SIGNIFICANT CHANGE UP (ref 7–23)
BUN SERPL-MCNC: 12 MG/DL — SIGNIFICANT CHANGE UP (ref 7–23)
CALCIUM SERPL-MCNC: 8.6 MG/DL — SIGNIFICANT CHANGE UP (ref 8.4–10.5)
CALCIUM SERPL-MCNC: 8.6 MG/DL — SIGNIFICANT CHANGE UP (ref 8.4–10.5)
CAST: 0 /LPF — SIGNIFICANT CHANGE UP (ref 0–4)
CHLORIDE BLDV-SCNC: 99 MMOL/L — SIGNIFICANT CHANGE UP (ref 96–108)
CHLORIDE SERPL-SCNC: 100 MMOL/L — SIGNIFICANT CHANGE UP (ref 98–107)
CHLORIDE SERPL-SCNC: 99 MMOL/L — SIGNIFICANT CHANGE UP (ref 98–107)
CO2 BLDV-SCNC: 33.1 MMOL/L — HIGH (ref 22–26)
CO2 SERPL-SCNC: 28 MMOL/L — SIGNIFICANT CHANGE UP (ref 22–31)
CO2 SERPL-SCNC: 29 MMOL/L — SIGNIFICANT CHANGE UP (ref 22–31)
COLOR SPEC: YELLOW — SIGNIFICANT CHANGE UP
CREAT SERPL-MCNC: 0.97 MG/DL — SIGNIFICANT CHANGE UP (ref 0.5–1.3)
CREAT SERPL-MCNC: 0.98 MG/DL — SIGNIFICANT CHANGE UP (ref 0.5–1.3)
DIFF PNL FLD: NEGATIVE — SIGNIFICANT CHANGE UP
EGFR: 80 ML/MIN/1.73M2 — SIGNIFICANT CHANGE UP
EGFR: 81 ML/MIN/1.73M2 — SIGNIFICANT CHANGE UP
GAS PNL BLDV: 133 MMOL/L — LOW (ref 136–145)
GLUCOSE BLDV-MCNC: 134 MG/DL — HIGH (ref 70–99)
GLUCOSE SERPL-MCNC: 119 MG/DL — HIGH (ref 70–99)
GLUCOSE SERPL-MCNC: 120 MG/DL — HIGH (ref 70–99)
GLUCOSE UR QL: NEGATIVE MG/DL — SIGNIFICANT CHANGE UP
HCO3 BLDV-SCNC: 32 MMOL/L — HIGH (ref 22–29)
HCT VFR BLD CALC: 42.8 % — SIGNIFICANT CHANGE UP (ref 39–50)
HCT VFR BLDA CALC: 48 % — SIGNIFICANT CHANGE UP (ref 39–51)
HGB BLD CALC-MCNC: 16.1 G/DL — SIGNIFICANT CHANGE UP (ref 12.6–17.4)
HGB BLD-MCNC: 15.1 G/DL — SIGNIFICANT CHANGE UP (ref 13–17)
INR BLD: 1.08 RATIO — SIGNIFICANT CHANGE UP (ref 0.85–1.18)
KETONES UR-MCNC: 15 MG/DL
LACTATE BLDV-MCNC: 1 MMOL/L — SIGNIFICANT CHANGE UP (ref 0.5–2)
LEUKOCYTE ESTERASE UR-ACNC: NEGATIVE — SIGNIFICANT CHANGE UP
MAGNESIUM SERPL-MCNC: 2.2 MG/DL — SIGNIFICANT CHANGE UP (ref 1.6–2.6)
MCHC RBC-ENTMCNC: 33.7 PG — SIGNIFICANT CHANGE UP (ref 27–34)
MCHC RBC-ENTMCNC: 35.3 GM/DL — SIGNIFICANT CHANGE UP (ref 32–36)
MCV RBC AUTO: 95.5 FL — SIGNIFICANT CHANGE UP (ref 80–100)
NITRITE UR-MCNC: NEGATIVE — SIGNIFICANT CHANGE UP
NRBC # BLD: 0 /100 WBCS — SIGNIFICANT CHANGE UP (ref 0–0)
NRBC # FLD: 0 K/UL — SIGNIFICANT CHANGE UP (ref 0–0)
PCO2 BLDV: 52 MMHG — SIGNIFICANT CHANGE UP (ref 42–55)
PH BLDV: 7.39 — SIGNIFICANT CHANGE UP (ref 7.32–7.43)
PH UR: 7 — SIGNIFICANT CHANGE UP (ref 5–8)
PHOSPHATE SERPL-MCNC: 2.3 MG/DL — LOW (ref 2.5–4.5)
PLATELET # BLD AUTO: 146 K/UL — LOW (ref 150–400)
PO2 BLDV: 32 MMHG — SIGNIFICANT CHANGE UP (ref 25–45)
POTASSIUM BLDV-SCNC: 3.9 MMOL/L — SIGNIFICANT CHANGE UP (ref 3.5–5.1)
POTASSIUM SERPL-MCNC: 3.8 MMOL/L — SIGNIFICANT CHANGE UP (ref 3.5–5.3)
POTASSIUM SERPL-MCNC: 4 MMOL/L — SIGNIFICANT CHANGE UP (ref 3.5–5.3)
POTASSIUM SERPL-SCNC: 3.8 MMOL/L — SIGNIFICANT CHANGE UP (ref 3.5–5.3)
POTASSIUM SERPL-SCNC: 4 MMOL/L — SIGNIFICANT CHANGE UP (ref 3.5–5.3)
PROT SERPL-MCNC: 7.1 G/DL — SIGNIFICANT CHANGE UP (ref 6–8.3)
PROT UR-MCNC: SIGNIFICANT CHANGE UP MG/DL
PROTHROM AB SERPL-ACNC: 12.2 SEC — SIGNIFICANT CHANGE UP (ref 9.5–13)
RBC # BLD: 4.48 M/UL — SIGNIFICANT CHANGE UP (ref 4.2–5.8)
RBC # FLD: 12.6 % — SIGNIFICANT CHANGE UP (ref 10.3–14.5)
RBC CASTS # UR COMP ASSIST: 0 /HPF — SIGNIFICANT CHANGE UP (ref 0–4)
SAO2 % BLDV: 54 % — LOW (ref 67–88)
SODIUM SERPL-SCNC: 138 MMOL/L — SIGNIFICANT CHANGE UP (ref 135–145)
SODIUM SERPL-SCNC: 138 MMOL/L — SIGNIFICANT CHANGE UP (ref 135–145)
SP GR SPEC: 1.07 — HIGH (ref 1–1.03)
SQUAMOUS # UR AUTO: 0 /HPF — SIGNIFICANT CHANGE UP (ref 0–5)
TROPONIN T, HIGH SENSITIVITY RESULT: 13 NG/L — SIGNIFICANT CHANGE UP
UROBILINOGEN FLD QL: 1 MG/DL — SIGNIFICANT CHANGE UP (ref 0.2–1)
WBC # BLD: 9.91 K/UL — SIGNIFICANT CHANGE UP (ref 3.8–10.5)
WBC # FLD AUTO: 9.91 K/UL — SIGNIFICANT CHANGE UP (ref 3.8–10.5)
WBC UR QL: 0 /HPF — SIGNIFICANT CHANGE UP (ref 0–5)

## 2024-03-08 PROCEDURE — 99223 1ST HOSP IP/OBS HIGH 75: CPT

## 2024-03-08 PROCEDURE — 70450 CT HEAD/BRAIN W/O DYE: CPT | Mod: 26,MC

## 2024-03-08 PROCEDURE — 74174 CTA ABD&PLVS W/CONTRAST: CPT | Mod: 26,MC,59

## 2024-03-08 PROCEDURE — 71275 CT ANGIOGRAPHY CHEST: CPT | Mod: 26,MC

## 2024-03-08 RX ORDER — SODIUM CHLORIDE 9 MG/ML
1000 INJECTION, SOLUTION INTRAVENOUS
Refills: 0 | Status: DISCONTINUED | OUTPATIENT
Start: 2024-03-08 | End: 2024-03-10

## 2024-03-08 RX ORDER — INSULIN LISPRO 100/ML
VIAL (ML) SUBCUTANEOUS AT BEDTIME
Refills: 0 | Status: DISCONTINUED | OUTPATIENT
Start: 2024-03-08 | End: 2024-03-10

## 2024-03-08 RX ORDER — GLUCAGON INJECTION, SOLUTION 0.5 MG/.1ML
1 INJECTION, SOLUTION SUBCUTANEOUS ONCE
Refills: 0 | Status: DISCONTINUED | OUTPATIENT
Start: 2024-03-08 | End: 2024-03-10

## 2024-03-08 RX ORDER — ATORVASTATIN CALCIUM 80 MG/1
40 TABLET, FILM COATED ORAL AT BEDTIME
Refills: 0 | Status: DISCONTINUED | OUTPATIENT
Start: 2024-03-08 | End: 2024-03-10

## 2024-03-08 RX ORDER — METFORMIN HYDROCHLORIDE 850 MG/1
1 TABLET ORAL
Refills: 0 | DISCHARGE

## 2024-03-08 RX ORDER — ASPIRIN/CALCIUM CARB/MAGNESIUM 324 MG
0 TABLET ORAL
Refills: 0 | DISCHARGE

## 2024-03-08 RX ORDER — APIXABAN 2.5 MG/1
1 TABLET, FILM COATED ORAL
Qty: 60 | Refills: 0
Start: 2024-03-08 | End: 2024-04-06

## 2024-03-08 RX ORDER — SODIUM CHLORIDE 9 MG/ML
1000 INJECTION INTRAMUSCULAR; INTRAVENOUS; SUBCUTANEOUS
Refills: 0 | Status: DISCONTINUED | OUTPATIENT
Start: 2024-03-08 | End: 2024-03-10

## 2024-03-08 RX ORDER — LOSARTAN POTASSIUM 100 MG/1
1 TABLET, FILM COATED ORAL
Refills: 0 | DISCHARGE

## 2024-03-08 RX ORDER — PANTOPRAZOLE SODIUM 20 MG/1
40 TABLET, DELAYED RELEASE ORAL
Refills: 0 | Status: DISCONTINUED | OUTPATIENT
Start: 2024-03-08 | End: 2024-03-10

## 2024-03-08 RX ORDER — HEPARIN SODIUM 5000 [USP'U]/ML
INJECTION INTRAVENOUS; SUBCUTANEOUS
Qty: 25000 | Refills: 0 | Status: DISCONTINUED | OUTPATIENT
Start: 2024-03-08 | End: 2024-03-09

## 2024-03-08 RX ORDER — DEXTROSE 50 % IN WATER 50 %
15 SYRINGE (ML) INTRAVENOUS ONCE
Refills: 0 | Status: DISCONTINUED | OUTPATIENT
Start: 2024-03-08 | End: 2024-03-10

## 2024-03-08 RX ORDER — MORPHINE SULFATE 50 MG/1
4 CAPSULE, EXTENDED RELEASE ORAL ONCE
Refills: 0 | Status: DISCONTINUED | OUTPATIENT
Start: 2024-03-08 | End: 2024-03-08

## 2024-03-08 RX ORDER — ACETAMINOPHEN 500 MG
650 TABLET ORAL EVERY 6 HOURS
Refills: 0 | Status: DISCONTINUED | OUTPATIENT
Start: 2024-03-08 | End: 2024-03-10

## 2024-03-08 RX ORDER — LOPERAMIDE HCL 2 MG
1 TABLET ORAL
Refills: 0 | DISCHARGE

## 2024-03-08 RX ORDER — ESOMEPRAZOLE MAGNESIUM 40 MG/1
1 CAPSULE, DELAYED RELEASE ORAL
Refills: 0 | DISCHARGE

## 2024-03-08 RX ORDER — DEXTROSE 50 % IN WATER 50 %
25 SYRINGE (ML) INTRAVENOUS ONCE
Refills: 0 | Status: DISCONTINUED | OUTPATIENT
Start: 2024-03-08 | End: 2024-03-10

## 2024-03-08 RX ORDER — ATORVASTATIN CALCIUM 80 MG/1
1 TABLET, FILM COATED ORAL
Refills: 0 | DISCHARGE

## 2024-03-08 RX ORDER — TADALAFIL 10 MG/1
1 TABLET, FILM COATED ORAL
Refills: 0 | DISCHARGE

## 2024-03-08 RX ORDER — DEXTROSE 50 % IN WATER 50 %
12.5 SYRINGE (ML) INTRAVENOUS ONCE
Refills: 0 | Status: DISCONTINUED | OUTPATIENT
Start: 2024-03-08 | End: 2024-03-10

## 2024-03-08 RX ORDER — LOSARTAN POTASSIUM 100 MG/1
50 TABLET, FILM COATED ORAL DAILY
Refills: 0 | Status: DISCONTINUED | OUTPATIENT
Start: 2024-03-08 | End: 2024-03-10

## 2024-03-08 RX ORDER — ASPIRIN/CALCIUM CARB/MAGNESIUM 324 MG
81 TABLET ORAL DAILY
Refills: 0 | Status: DISCONTINUED | OUTPATIENT
Start: 2024-03-08 | End: 2024-03-10

## 2024-03-08 RX ORDER — INSULIN LISPRO 100/ML
VIAL (ML) SUBCUTANEOUS
Refills: 0 | Status: DISCONTINUED | OUTPATIENT
Start: 2024-03-08 | End: 2024-03-10

## 2024-03-08 RX ORDER — ACETAMINOPHEN 500 MG
1000 TABLET ORAL ONCE
Refills: 0 | Status: COMPLETED | OUTPATIENT
Start: 2024-03-08 | End: 2024-03-09

## 2024-03-08 RX ADMIN — HEPARIN SODIUM 1400 UNIT(S)/HR: 5000 INJECTION INTRAVENOUS; SUBCUTANEOUS at 07:26

## 2024-03-08 RX ADMIN — SODIUM CHLORIDE 100 MILLILITER(S): 9 INJECTION INTRAMUSCULAR; INTRAVENOUS; SUBCUTANEOUS at 13:12

## 2024-03-08 RX ADMIN — HEPARIN SODIUM 1400 UNIT(S)/HR: 5000 INJECTION INTRAVENOUS; SUBCUTANEOUS at 18:51

## 2024-03-08 RX ADMIN — HEPARIN SODIUM 1400 UNIT(S)/HR: 5000 INJECTION INTRAVENOUS; SUBCUTANEOUS at 11:11

## 2024-03-08 RX ADMIN — HEPARIN SODIUM 1400 UNIT(S)/HR: 5000 INJECTION INTRAVENOUS; SUBCUTANEOUS at 03:31

## 2024-03-08 RX ADMIN — MORPHINE SULFATE 4 MILLIGRAM(S): 50 CAPSULE, EXTENDED RELEASE ORAL at 03:25

## 2024-03-08 RX ADMIN — ATORVASTATIN CALCIUM 40 MILLIGRAM(S): 80 TABLET, FILM COATED ORAL at 21:56

## 2024-03-08 RX ADMIN — HEPARIN SODIUM 1400 UNIT(S)/HR: 5000 INJECTION INTRAVENOUS; SUBCUTANEOUS at 05:57

## 2024-03-08 RX ADMIN — HEPARIN SODIUM 1400 UNIT(S)/HR: 5000 INJECTION INTRAVENOUS; SUBCUTANEOUS at 21:00

## 2024-03-08 RX ADMIN — HEPARIN SODIUM 1400 UNIT(S)/HR: 5000 INJECTION INTRAVENOUS; SUBCUTANEOUS at 21:47

## 2024-03-08 RX ADMIN — Medication 81 MILLIGRAM(S): at 11:10

## 2024-03-08 NOTE — H&P ADULT - ASSESSMENT
72 yo M PMH HTN, HLD, DM2, CVA presents with chest pain and abdominal pain x 3 days. CTA C/A/P revealed an 11 mm thrombus in the mid thoracic aortic arch. Mildly enlarged pancreatic tail with mild peripancreatic inflammatory change which may represent acute interstitial edematous pancreatitis. present

## 2024-03-08 NOTE — H&P ADULT - NSHPREVIEWOFSYSTEMS_GEN_ALL_CORE
Review of Systems:   CONSTITUTIONAL: No fever, weight loss  EYES: No eye pain, visual disturbances, or discharge  ENMT:  No difficulty hearing, tinnitus, vertigo; No sinus or throat pain  RESPIRATORY: No SOB. No cough, wheezing, chills or hemoptysis  CARDIOVASCULAR: +chest pain, no palpitations, dizziness, or leg swelling  GASTROINTESTINAL: +abdominal or epigastric pain. No nausea, vomiting, or hematemesis; No diarrhea or constipation. No melena or hematochezia.  GENITOURINARY: No dysuria, frequency, hematuria, or incontinence  NEUROLOGICAL: No headaches, memory loss, loss of strength, numbness, or tremors  SKIN: No itching, burning, rashes, or lesions   LYMPH NODES: No enlarged glands  ENDOCRINE: No heat or cold intolerance; No hair loss  MUSCULOSKELETAL: No joint pain or swelling  PSYCHIATRIC: No depression, anxiety, mood swings, or difficulty sleeping  HEME/LYMPH: No easy bruising, or bleeding gums

## 2024-03-08 NOTE — H&P ADULT - NSICDXPASTMEDICALHX_GEN_ALL_CORE_FT
PAST MEDICAL HISTORY:  CVA (cerebrovascular accident)     Diabetes     DM (diabetes mellitus)     Hyperlipidemia     Hypertension
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
## 2024-03-08 NOTE — H&P ADULT - HISTORY OF PRESENT ILLNESS
74 yo M PMH HTN, HLD, DM2, CVA presents with chest pain and abdominal pain x 3 days. Patient says abdominal pain is b/l lower, constant, nonradiating, worse with breathing. Patient otherwise denies nausea, vomiting, diarrhea, hematochezia, fevers, chills. Patient c/o chest pain L sided, intermittent, exertional, nonradiating. Patient describes associated LUCERO - he will feel winded walking 1-2 blocks. Patient otherwise denies palpitations or dizziness. In the ED patient is hemodynamically stable. CTA C/A/P revealed an 11 mm thrombus in the mid thoracic aortic arch. Mildly enlarged pancreatic tail with mild peripancreatic inflammatory change which may represent acute interstitial edematous pancreatitis. Patient said he went to a physician Dr. Last in the past in [facility] who told him that he had an "abnormality" and had to have a repeat US in 9-12 months.

## 2024-03-08 NOTE — ED ADULT NURSE REASSESSMENT NOTE - NS ED NURSE REASSESS COMMENT FT1
Pt a&ox4, normal sinus rhythm on cardiac monitor, denies CP, SOB, or dyspnea at this time. Airway is patent, speaking in clear and coherent sentences.
Pt resting comfortably in bed, normal sinus rhythm on cardiac monitor, denies CP, SOB, or dyspnea at this time. Airway is patent, speaking in clear and coherent sentences. MD Ace made aware of rectal temp.
Pt a&ox3, normal sinus rhythm on cardiac monitor, denies CP, SOB, or dyspnea at this time. Endorses improvement in abdominal pain. Additional 18GIV placed to right forearm. Airway is patent, speaking in clear and coherent sentences. Respirations are even and unlabored, no signs of respiratory distress.

## 2024-03-08 NOTE — PATIENT PROFILE ADULT - FALL HARM RISK - HARM RISK INTERVENTIONS

## 2024-03-08 NOTE — H&P ADULT - NSHPLABSRESULTS_GEN_ALL_CORE
LABS:                        15.1   9.91  )-----------( 146      ( 08 Mar 2024 09:41 )             42.8     03-07    139  |  99  |  10  ----------------------------<  132<H>  3.9   |  27  |  0.96    Ca    9.0      07 Mar 2024 22:24    TPro  7.6  /  Alb  4.0  /  TBili  1.1  /  DBili  x   /  AST  17  /  ALT  14  /  AlkPhos  73  03-07    PT/INR - ( 08 Mar 2024 02:35 )   PT: 12.2 sec;   INR: 1.08 ratio         PTT - ( 08 Mar 2024 09:41 )  PTT:66.8 sec      Urinalysis Basic - ( 08 Mar 2024 05:30 )    Color: Yellow / Appearance: Clear / S.066 / pH: x  Gluc: x / Ketone: 15 mg/dL  / Bili: Negative / Urobili: 1.0 mg/dL   Blood: x / Protein: Trace mg/dL / Nitrite: Negative   Leuk Esterase: Negative / RBC: 0 /HPF / WBC 0 /HPF   Sq Epi: x / Non Sq Epi: 0 /HPF / Bacteria: Negative /HPF            RADIOLOGY & ADDITIONAL TESTS:  New Imaging Personally Reviewed Today:  New Electrocardiogram Personally Reviewed Today:  Other Results Reviewed Today:   Prior or Outpatient Records Reviewed Today with Summary:

## 2024-03-08 NOTE — H&P ADULT - PROBLEM SELECTOR PLAN 3
CTA C/A/P revealed an 11 mm thrombus in the mid thoracic aortic arch  Of note patient went to a physician on [facility] in [place] who found an abnormality on US and advised to obtain repeat US in 9-12 months - please obtain records  ED initiated heparin gtt  Appreciate CTS eval  Plan to transition to [facility] f/u price check  Patient should follow-up with CTS in 6 months with repeat CTA chest

## 2024-03-08 NOTE — H&P ADULT - PROBLEM SELECTOR PLAN 1
Patient presents with abdominal pain x 3 days.   CTA C/A/P revealed an 11 mm thrombus in the mid thoracic aortic arch. Mildly enlarged pancreatic tail with mild peripancreatic inflammatory change which may represent acute interstitial edematous pancreatitis.  Lipase negative, f/u amylase  Management of thrombus as below  Will start maintenance IVF  Tylenol PRN

## 2024-03-08 NOTE — CHART NOTE - NSCHARTNOTEFT_GEN_A_CORE
Cardiac Surgery  CC: aortic arch thrombus   HPI: 73M h/o HTN, DM II and CVA p/w progressive abdominal / chest pain over the past three days was subsequently found to have an ~15mm aortic arch thrombus in the mid / distal arch that appears to originate at foci of atherosclerotic plaque.       - No signs of acute aortic syndrome [dissection, rupture, IMH] or malperfusion. Possible abdominal pain could be from embolism, however given his reported progressive nature of pain, unlikely. The thrombus originates after the head vessels, so stroke risk is negligible. There is always the risk of downfield thromboembolism, however, so I would recommend systemic anticoagulation with heparin gtt with ptt goal 50-70 provided the patient has no contraindication for anticoagulation. Can transition to any oral anticoagulant [e.g. Eliquis, Xarelto, Coumadin] at primary care teams' discretion at any point. Can follow up in our office in 6 months with a repeat CTA of the chest [Office number: [phone]].      Valentin Lipscomb MD  Cardiac Surgery

## 2024-03-09 LAB
A1C WITH ESTIMATED AVERAGE GLUCOSE RESULT: 6.4 % — HIGH (ref 4–5.6)
ALBUMIN SERPL ELPH-MCNC: 3.4 G/DL — SIGNIFICANT CHANGE UP (ref 3.3–5)
ALP SERPL-CCNC: 64 U/L — SIGNIFICANT CHANGE UP (ref 40–120)
ALT FLD-CCNC: 14 U/L — SIGNIFICANT CHANGE UP (ref 4–41)
AMYLASE P1 CFR SERPL: 49 U/L — SIGNIFICANT CHANGE UP (ref 25–125)
ANION GAP SERPL CALC-SCNC: 11 MMOL/L — SIGNIFICANT CHANGE UP (ref 7–14)
APTT BLD: 120.4 SEC — CRITICAL HIGH (ref 24.5–35.6)
APTT BLD: 36.7 SEC — HIGH (ref 24.5–35.6)
APTT BLD: 54.8 SEC — HIGH (ref 24.5–35.6)
AST SERPL-CCNC: 14 U/L — SIGNIFICANT CHANGE UP (ref 4–40)
BILIRUB SERPL-MCNC: 0.7 MG/DL — SIGNIFICANT CHANGE UP (ref 0.2–1.2)
BUN SERPL-MCNC: 12 MG/DL — SIGNIFICANT CHANGE UP (ref 7–23)
CALCIUM SERPL-MCNC: 8.4 MG/DL — SIGNIFICANT CHANGE UP (ref 8.4–10.5)
CHLORIDE SERPL-SCNC: 103 MMOL/L — SIGNIFICANT CHANGE UP (ref 98–107)
CHOLEST SERPL-MCNC: 127 MG/DL — SIGNIFICANT CHANGE UP
CO2 SERPL-SCNC: 25 MMOL/L — SIGNIFICANT CHANGE UP (ref 22–31)
CREAT SERPL-MCNC: 0.95 MG/DL — SIGNIFICANT CHANGE UP (ref 0.5–1.3)
CULTURE RESULTS: SIGNIFICANT CHANGE UP
EGFR: 83 ML/MIN/1.73M2 — SIGNIFICANT CHANGE UP
ESTIMATED AVERAGE GLUCOSE: 137 — SIGNIFICANT CHANGE UP
GLUCOSE SERPL-MCNC: 136 MG/DL — HIGH (ref 70–99)
HCT VFR BLD CALC: 38.8 % — LOW (ref 39–50)
HCV AB S/CO SERPL IA: 0.06 S/CO — SIGNIFICANT CHANGE UP (ref 0–0.99)
HCV AB SERPL-IMP: SIGNIFICANT CHANGE UP
HDLC SERPL-MCNC: 52 MG/DL — SIGNIFICANT CHANGE UP
HGB BLD-MCNC: 13.6 G/DL — SIGNIFICANT CHANGE UP (ref 13–17)
LIDOCAIN IGE QN: 44 U/L — SIGNIFICANT CHANGE UP (ref 7–60)
LIPID PNL WITH DIRECT LDL SERPL: 58 MG/DL — SIGNIFICANT CHANGE UP
MCHC RBC-ENTMCNC: 33.2 PG — SIGNIFICANT CHANGE UP (ref 27–34)
MCHC RBC-ENTMCNC: 35.1 GM/DL — SIGNIFICANT CHANGE UP (ref 32–36)
MCV RBC AUTO: 94.6 FL — SIGNIFICANT CHANGE UP (ref 80–100)
NON HDL CHOLESTEROL: 75 MG/DL — SIGNIFICANT CHANGE UP
NRBC # BLD: 0 /100 WBCS — SIGNIFICANT CHANGE UP (ref 0–0)
NRBC # FLD: 0 K/UL — SIGNIFICANT CHANGE UP (ref 0–0)
PLATELET # BLD AUTO: 142 K/UL — LOW (ref 150–400)
POTASSIUM SERPL-MCNC: 4 MMOL/L — SIGNIFICANT CHANGE UP (ref 3.5–5.3)
POTASSIUM SERPL-SCNC: 4 MMOL/L — SIGNIFICANT CHANGE UP (ref 3.5–5.3)
PROT SERPL-MCNC: 6.6 G/DL — SIGNIFICANT CHANGE UP (ref 6–8.3)
RBC # BLD: 4.1 M/UL — LOW (ref 4.2–5.8)
RBC # FLD: 12.3 % — SIGNIFICANT CHANGE UP (ref 10.3–14.5)
SODIUM SERPL-SCNC: 139 MMOL/L — SIGNIFICANT CHANGE UP (ref 135–145)
SPECIMEN SOURCE: SIGNIFICANT CHANGE UP
TRIGL SERPL-MCNC: 83 MG/DL — SIGNIFICANT CHANGE UP
WBC # BLD: 8.85 K/UL — SIGNIFICANT CHANGE UP (ref 3.8–10.5)
WBC # FLD AUTO: 8.85 K/UL — SIGNIFICANT CHANGE UP (ref 3.8–10.5)

## 2024-03-09 PROCEDURE — 99232 SBSQ HOSP IP/OBS MODERATE 35: CPT

## 2024-03-09 RX ORDER — APIXABAN 2.5 MG/1
10 TABLET, FILM COATED ORAL
Refills: 0 | Status: DISCONTINUED | OUTPATIENT
Start: 2024-03-09 | End: 2024-03-10

## 2024-03-09 RX ADMIN — HEPARIN SODIUM 1600 UNIT(S)/HR: 5000 INJECTION INTRAVENOUS; SUBCUTANEOUS at 07:36

## 2024-03-09 RX ADMIN — SODIUM CHLORIDE 100 MILLILITER(S): 9 INJECTION INTRAMUSCULAR; INTRAVENOUS; SUBCUTANEOUS at 19:47

## 2024-03-09 RX ADMIN — Medication 81 MILLIGRAM(S): at 12:23

## 2024-03-09 RX ADMIN — HEPARIN SODIUM 1400 UNIT(S)/HR: 5000 INJECTION INTRAVENOUS; SUBCUTANEOUS at 07:12

## 2024-03-09 RX ADMIN — LOSARTAN POTASSIUM 50 MILLIGRAM(S): 100 TABLET, FILM COATED ORAL at 05:40

## 2024-03-09 RX ADMIN — APIXABAN 10 MILLIGRAM(S): 2.5 TABLET, FILM COATED ORAL at 18:22

## 2024-03-09 RX ADMIN — Medication 400 MILLIGRAM(S): at 07:37

## 2024-03-09 RX ADMIN — HEPARIN SODIUM 1400 UNIT(S)/HR: 5000 INJECTION INTRAVENOUS; SUBCUTANEOUS at 14:44

## 2024-03-09 RX ADMIN — SODIUM CHLORIDE 100 MILLILITER(S): 9 INJECTION INTRAMUSCULAR; INTRAVENOUS; SUBCUTANEOUS at 08:18

## 2024-03-09 RX ADMIN — PANTOPRAZOLE SODIUM 40 MILLIGRAM(S): 20 TABLET, DELAYED RELEASE ORAL at 05:38

## 2024-03-09 RX ADMIN — Medication 1000 MILLIGRAM(S): at 08:07

## 2024-03-09 RX ADMIN — ATORVASTATIN CALCIUM 40 MILLIGRAM(S): 80 TABLET, FILM COATED ORAL at 21:16

## 2024-03-09 RX ADMIN — HEPARIN SODIUM 1400 UNIT(S)/HR: 5000 INJECTION INTRAVENOUS; SUBCUTANEOUS at 14:30

## 2024-03-09 RX ADMIN — Medication 2: at 12:22

## 2024-03-09 NOTE — PROGRESS NOTE ADULT - ASSESSMENT
74 yo M PMH HTN, HLD, DM2, CVA presents with chest pain and abdominal pain x 3 days. CTA C/A/P revealed an 11 mm thrombus in the mid thoracic aortic arch. Mildly enlarged pancreatic tail with mild peripancreatic inflammatory change which may represent acute interstitial edematous pancreatitis.

## 2024-03-09 NOTE — PROGRESS NOTE ADULT - SUBJECTIVE AND OBJECTIVE BOX
Medicine Progress Note    Patient is a 73y old  Male who presents with a chief complaint of abdominal pain     SUBJECTIVE / OVERNIGHT EVENTS: negative     ADDITIONAL REVIEW OF SYSTEMS:    MEDICATIONS  (STANDING):  aspirin  chewable 81 milliGRAM(s) Oral daily  atorvastatin 40 milliGRAM(s) Oral at bedtime  dextrose 5%. 1000 milliLiter(s) (100 mL/Hr) IV Continuous <Continuous>  dextrose 5%. 1000 milliLiter(s) (50 mL/Hr) IV Continuous <Continuous>  dextrose 50% Injectable 25 Gram(s) IV Push once  dextrose 50% Injectable 12.5 Gram(s) IV Push once  dextrose 50% Injectable 25 Gram(s) IV Push once  glucagon  Injectable 1 milliGRAM(s) IntraMuscular once  heparin  Infusion.  Unit(s)/Hr (14 mL/Hr) IV Continuous <Continuous>  insulin lispro (ADMELOG) corrective regimen sliding scale   SubCutaneous three times a day before meals  insulin lispro (ADMELOG) corrective regimen sliding scale   SubCutaneous at bedtime  losartan 50 milliGRAM(s) Oral daily  pantoprazole    Tablet 40 milliGRAM(s) Oral before breakfast  sodium chloride 0.9%. 1000 milliLiter(s) (100 mL/Hr) IV Continuous <Continuous>    MEDICATIONS  (PRN):  acetaminophen     Tablet .. 650 milliGRAM(s) Oral every 6 hours PRN Temp greater or equal to 38C (100.4F), Mild Pain (1 - 3)  dextrose Oral Gel 15 Gram(s) Oral once PRN Blood Glucose LESS THAN 70 milliGRAM(s)/deciliter    CAPILLARY BLOOD GLUCOSE      POCT Blood Glucose.: 163 mg/dL (09 Mar 2024 11:36)  POCT Blood Glucose.: 117 mg/dL (09 Mar 2024 07:11)  POCT Blood Glucose.: 156 mg/dL (08 Mar 2024 21:50)  POCT Blood Glucose.: 117 mg/dL (08 Mar 2024 17:14)    I&O's Summary      PHYSICAL EXAM:  Vital Signs Last 24 Hrs  T(C): 36.9 (09 Mar 2024 12:00), Max: 37.6 (08 Mar 2024 21:53)  T(F): 98.4 (09 Mar 2024 12:00), Max: 99.7 (08 Mar 2024 21:53)  HR: 72 (09 Mar 2024 12:00) (60 - 85)  BP: 138/76 (09 Mar 2024 12:00) (136/76 - 152/79)  BP(mean): --  RR: 17 (09 Mar 2024 12:00) (17 - 18)  SpO2: 99% (09 Mar 2024 12:00) (98% - 100%)    Parameters below as of 09 Mar 2024 12:00  Patient On (Oxygen Delivery Method): room air      CONSTITUTIONAL: NAD,   ENMT: Moist oral mucosa, no pharyngeal injection or exudates;   RESPIRATORY: Normal respiratory effort; lungs are clear to auscultation bilaterally  CARDIOVASCULAR: Regular rate and rhythm, normal S1 and S2,; No lower extremity edema;  ABDOMEN: Tender to palpation LLQ, normoactive bowel sounds, small ventral hernia, reductive   PSYCH: A+O to person, place, and time; affect appropriate  NEUROLOGY: CN 2-12 are intact and symmetric; no gross sensory deficits   SKIN: No rashes; no palpable lesions    LABS:                        13.6   8.85  )-----------( 142      ( 09 Mar 2024 05:02 )             38.8     03-09    139  |  103  |  12  ----------------------------<  136<H>  4.0   |  25  |  0.95    Ca    8.4      09 Mar 2024 05:02  Phos  2.3     03-08  Mg     2.20     03-08    TPro  6.6  /  Alb  3.4  /  TBili  0.7  /  DBili  x   /  AST  14  /  ALT  14  /  AlkPhos  64  03-09    PT/INR - ( 08 Mar 2024 02:35 )   PT: 12.2 sec;   INR: 1.08 ratio         PTT - ( 09 Mar 2024 13:15 )  PTT:120.4 sec      Urinalysis Basic - ( 09 Mar 2024 05:02 )    Color: x / Appearance: x / SG: x / pH: x  Gluc: 136 mg/dL / Ketone: x  / Bili: x / Urobili: x   Blood: x / Protein: x / Nitrite: x   Leuk Esterase: x / RBC: x / WBC x   Sq Epi: x / Non Sq Epi: x / Bacteria: x            RADIOLOGY & ADDITIONAL TESTS:  Imaging from Last 24 Hours:    Electrocardiogram/QTc Interval:    COORDINATION OF CARE:  Care Discussed with Consultants/Other Providers: ACP

## 2024-03-09 NOTE — PROGRESS NOTE ADULT - PROBLEM SELECTOR PLAN 3
CTA C/A/P revealed an 11 mm thrombus in the mid thoracic aortic arch  Of note patient went to a physician on [facility] in [place] who found an abnormality on US and advised to obtain repeat US in 9-12 months - please obtain records  ED initiated heparin gtt, switch to DOACS covered   Appreciate CTS eval  Plan to transition to DOAC   Patient should follow-up with CTS in 6 months with repeat CTA chest

## 2024-03-10 ENCOUNTER — TRANSCRIPTION ENCOUNTER (OUTPATIENT)
Age: 74
End: 2024-03-10

## 2024-03-10 VITALS
OXYGEN SATURATION: 98 % | HEART RATE: 60 BPM | SYSTOLIC BLOOD PRESSURE: 127 MMHG | RESPIRATION RATE: 17 BRPM | TEMPERATURE: 98 F | DIASTOLIC BLOOD PRESSURE: 66 MMHG

## 2024-03-10 LAB
ALBUMIN SERPL ELPH-MCNC: 3.4 G/DL — SIGNIFICANT CHANGE UP (ref 3.3–5)
ALP SERPL-CCNC: 61 U/L — SIGNIFICANT CHANGE UP (ref 40–120)
ALT FLD-CCNC: 12 U/L — SIGNIFICANT CHANGE UP (ref 4–41)
ANION GAP SERPL CALC-SCNC: 11 MMOL/L — SIGNIFICANT CHANGE UP (ref 7–14)
AST SERPL-CCNC: 11 U/L — SIGNIFICANT CHANGE UP (ref 4–40)
BILIRUB SERPL-MCNC: 0.5 MG/DL — SIGNIFICANT CHANGE UP (ref 0.2–1.2)
BUN SERPL-MCNC: 11 MG/DL — SIGNIFICANT CHANGE UP (ref 7–23)
CALCIUM SERPL-MCNC: 8.4 MG/DL — SIGNIFICANT CHANGE UP (ref 8.4–10.5)
CHLORIDE SERPL-SCNC: 106 MMOL/L — SIGNIFICANT CHANGE UP (ref 98–107)
CO2 SERPL-SCNC: 24 MMOL/L — SIGNIFICANT CHANGE UP (ref 22–31)
CREAT SERPL-MCNC: 0.93 MG/DL — SIGNIFICANT CHANGE UP (ref 0.5–1.3)
EGFR: 86 ML/MIN/1.73M2 — SIGNIFICANT CHANGE UP
GLUCOSE SERPL-MCNC: 130 MG/DL — HIGH (ref 70–99)
HCT VFR BLD CALC: 39.2 % — SIGNIFICANT CHANGE UP (ref 39–50)
HGB BLD-MCNC: 13.6 G/DL — SIGNIFICANT CHANGE UP (ref 13–17)
MCHC RBC-ENTMCNC: 33.7 PG — SIGNIFICANT CHANGE UP (ref 27–34)
MCHC RBC-ENTMCNC: 34.7 GM/DL — SIGNIFICANT CHANGE UP (ref 32–36)
MCV RBC AUTO: 97.3 FL — SIGNIFICANT CHANGE UP (ref 80–100)
NRBC # BLD: 0 /100 WBCS — SIGNIFICANT CHANGE UP (ref 0–0)
NRBC # FLD: 0 K/UL — SIGNIFICANT CHANGE UP (ref 0–0)
PLATELET # BLD AUTO: 137 K/UL — LOW (ref 150–400)
POTASSIUM SERPL-MCNC: 4 MMOL/L — SIGNIFICANT CHANGE UP (ref 3.5–5.3)
POTASSIUM SERPL-SCNC: 4 MMOL/L — SIGNIFICANT CHANGE UP (ref 3.5–5.3)
PROT SERPL-MCNC: 6.4 G/DL — SIGNIFICANT CHANGE UP (ref 6–8.3)
RBC # BLD: 4.03 M/UL — LOW (ref 4.2–5.8)
RBC # FLD: 12.5 % — SIGNIFICANT CHANGE UP (ref 10.3–14.5)
SODIUM SERPL-SCNC: 141 MMOL/L — SIGNIFICANT CHANGE UP (ref 135–145)
WBC # BLD: 7.51 K/UL — SIGNIFICANT CHANGE UP (ref 3.8–10.5)
WBC # FLD AUTO: 7.51 K/UL — SIGNIFICANT CHANGE UP (ref 3.8–10.5)

## 2024-03-10 PROCEDURE — 99239 HOSP IP/OBS DSCHRG MGMT >30: CPT

## 2024-03-10 RX ORDER — APIXABAN 2.5 MG/1
1 TABLET, FILM COATED ORAL
Qty: 76 | Refills: 0
Start: 2024-03-10 | End: 2024-04-08

## 2024-03-10 RX ORDER — ACETAMINOPHEN 500 MG
1000 TABLET ORAL ONCE
Refills: 0 | Status: COMPLETED | OUTPATIENT
Start: 2024-03-10 | End: 2024-03-10

## 2024-03-10 RX ORDER — OXYCODONE HYDROCHLORIDE 5 MG/1
1 TABLET ORAL
Qty: 28 | Refills: 0
Start: 2024-03-10 | End: 2024-03-16

## 2024-03-10 RX ORDER — MELOXICAM 15 MG/1
1 TABLET ORAL
Refills: 0 | DISCHARGE

## 2024-03-10 RX ADMIN — PANTOPRAZOLE SODIUM 40 MILLIGRAM(S): 20 TABLET, DELAYED RELEASE ORAL at 05:20

## 2024-03-10 RX ADMIN — LOSARTAN POTASSIUM 50 MILLIGRAM(S): 100 TABLET, FILM COATED ORAL at 05:20

## 2024-03-10 RX ADMIN — Medication 4: at 11:13

## 2024-03-10 RX ADMIN — SODIUM CHLORIDE 100 MILLILITER(S): 9 INJECTION INTRAMUSCULAR; INTRAVENOUS; SUBCUTANEOUS at 06:35

## 2024-03-10 RX ADMIN — Medication 1000 MILLIGRAM(S): at 10:51

## 2024-03-10 RX ADMIN — Medication 400 MILLIGRAM(S): at 10:21

## 2024-03-10 RX ADMIN — APIXABAN 10 MILLIGRAM(S): 2.5 TABLET, FILM COATED ORAL at 05:19

## 2024-03-10 RX ADMIN — APIXABAN 10 MILLIGRAM(S): 2.5 TABLET, FILM COATED ORAL at 17:25

## 2024-03-10 RX ADMIN — Medication 81 MILLIGRAM(S): at 11:13

## 2024-03-10 NOTE — DISCHARGE NOTE NURSING/CASE MANAGEMENT/SOCIAL WORK - PATIENT PORTAL LINK FT
You can access the FollowMyHealth Patient Portal offered by [facility] by registering at the following website: [email or web address]. By joining [facility]’s FollowMyHealth portal, you will also be able to view your health information using other applications (apps) compatible with our system.

## 2024-03-10 NOTE — DISCHARGE NOTE PROVIDER - NSDCFUADDAPPT_GEN_ALL_CORE_FT
Follow up with cardiothoracic surgery in 6 months for a repeat CTA of the chest [Office number: [phone]].  Follow up with cardiothoracic surgery in 6 months for a repeat CT angio of the chest [Office number: [phone]].

## 2024-03-10 NOTE — DISCHARGE NOTE PROVIDER - HOSPITAL COURSE
74 yo M PMH HTN, HLD, DM2, CVA presents with chest pain and abdominal pain x 3 days. Patient says abdominal pain is b/l lower, constant, nonradiating, worse with breathing. Patient otherwise denies nausea, vomiting, diarrhea, hematochezia, fevers, chills. Patient c/o chest pain L sided, intermittent, exertional, nonradiating. Patient describes associated LUCERO - he will feel winded walking 1-2 blocks. Patient otherwise denies palpitations or dizziness. In the ED patient is hemodynamically stable. CTA C/A/P revealed an 11 mm thrombus in the mid thoracic aortic arch. Mildly enlarged pancreatic tail with mild peripancreatic inflammatory change which may represent acute interstitial edematous pancreatitis. Patient said he went to a physician Dr. Last in the past in [facility] who told him that he had an "abnormality" and had to have a repeat US in 9-12 months.     Hospital course : Patient was seen in consult with surgical cardiology :  No signs of acute aortic syndrome [dissection, rupture, IMH] or malperfusion. Possible abdominal pain could be from embolism, however given his reported progressive nature of pain, unlikely. The thrombus originates after the head vessels, so stroke risk is negligible. There is always the risk of downfield thromboembolism, however, so I would recommend systemic anticoagulation with heparin gtt with ptt goal 50-70 provided the patient has no contraindication for anticoagulation. Can transition to any oral anticoagulant [e.g. Eliquis, Xarelto, Coumadin] at primary care teams' discretion at any point. Can follow up in our office in 6 months with a repeat CTA of the chest [Office number: [phone]].  Eliquis was verified with pharmacy, patient does not require co-pay, will DC home on Eliquis with follow up as above.    74 yo M PMH HTN, HLD, DM2, CVA presents with chest pain and abdominal pain x 3 days. Patient says abdominal pain is b/l lower, constant, nonradiating, worse with breathing. Patient otherwise denies nausea, vomiting, diarrhea, hematochezia, fevers, chills. Patient c/o chest pain L sided, intermittent, exertional, nonradiating. Patient describes associated LUCERO - he will feel winded walking 1-2 blocks. Patient otherwise denies palpitations or dizziness. In the ED patient is hemodynamically stable. CTA C/A/P revealed an 11 mm thrombus in the mid thoracic aortic arch. Mildly enlarged pancreatic tail with mild peripancreatic inflammatory change which may represent acute interstitial edematous pancreatitis. Patient said he went to a physician Dr. Last in the past in [facility] who told him that he had an "abnormality" and had to have a repeat US in 9-12 months.     Hospital course : Patient was seen in consult with surgical cardiology :  No signs of acute aortic syndrome [dissection, rupture, IMH] or malperfusion. Possible abdominal pain could be from embolism, however given his reported progressive nature of pain, unlikely. The thrombus originates after the head vessels, so stroke risk is negligible. There is always the risk of downfield thromboembolism, however, so I would recommend systemic anticoagulation with heparin gtt with ptt goal 50-70 provided the patient has no contraindication for anticoagulation. Can transition to any oral anticoagulant [e.g. Eliquis, Xarelto, Coumadin] at primary care teams' discretion at any point. Can follow up in our office in 6 months with a repeat CTA of the chest [Office number: [phone]].  Eliquis was verified with pharmacy, patient does not require co-pay, will DC home on Eliquis with follow up as above.   Pancreatitis, tolerating food.

## 2024-03-10 NOTE — DISCHARGE NOTE NURSING/CASE MANAGEMENT/SOCIAL WORK - NSDCPEFALRISK_GEN_ALL_CORE
For information on Fall & Injury Prevention, visit: [email or web address] OR  [email or web address] OR  https://www.cdc.gov/steadi/patient.html independent

## 2024-03-10 NOTE — DISCHARGE NOTE PROVIDER - ATTENDING DISCHARGE PHYSICAL EXAMINATION:
.  VITAL SIGNS:  T(C): 36.4 (03-10-24 @ 11:10), Max: 37 (03-09-24 @ 21:15)  T(F): 97.6 (03-10-24 @ 11:10), Max: 98.6 (03-09-24 @ 21:15)  HR: 60 (03-10-24 @ 11:10) (60 - 82)  BP: 127/66 (03-10-24 @ 11:10) (124/69 - 154/73)  BP(mean): --  RR: 17 (03-10-24 @ 11:10) (17 - 18)  SpO2: 98% (03-10-24 @ 11:10) (98% - 100%)  Wt(kg): --    PHYSICAL EXAM:    Constitutional: WDWN resting comfortably in bed; NAD  Head: NC/AT  Eyes: PERRL, EOMI, clear conjunctiva  ENT: no nasal discharge; uvula midline, no oropharyngeal erythema or exudates;   Neck: supple;   Respiratory: CTA B/L; no W/R/R, no retractions  Cardiac: +S1/S2;   Gastrointestinal: soft, tender on palpation LLQ; no rebound or guarding;   Back: spine midline, no bony tenderness or step-offs; no CVAT B/L  Extremities: WWP, no clubbing or cyanosis; no peripheral edema  Musculoskeletal: NROM x4; no joint swelling, tenderness or erythema  Neurologic: AAOx3; CNII-XII grossly intact; no focal deficits  Psychiatric: affect and characteristics of appearance, verbalizations, behaviors are appropriate

## 2024-03-10 NOTE — DISCHARGE NOTE NURSING/CASE MANAGEMENT/SOCIAL WORK - NSDCFUADDAPPT_GEN_ALL_CORE_FT
Follow up with cardiothoracic surgery in 6 months for a repeat CT angio of the chest [Office number: [phone]].

## 2024-03-10 NOTE — DISCHARGE NOTE PROVIDER - NSDCMRMEDTOKEN_GEN_ALL_CORE_FT
aspirin 81 mg oral tablet: orally once a day  Eliquis Starter Pack for Treatment of DVT and PE 5 mg oral tablet: 1 tab(s) orally 2 times a day please test for coverage  esomeprazole 20 mg oral delayed release capsule: 1 cap(s) orally once a day  Lipitor 40 mg oral tablet: 1 tab(s) orally once a day  loperamide 2 mg oral tablet: 1 tab(s) orally as needed for  diarrhea  losartan 50 mg oral tablet: 1 tab(s) orally once a day  meloxicam 15 mg oral tablet: 1 tab(s) orally once a day  metFORMIN 500 mg oral tablet: 1 tab(s) orally once a day  tadalafil 20 mg oral tablet: 1 tab(s) orally once a day PRN sexual activity   aspirin 81 mg oral tablet: orally once a day  Eliquis Starter Pack for Treatment of DVT and PE 5 mg oral tablet: 1 tab(s) orally 2 times a day please test for coverage  esomeprazole 20 mg oral delayed release capsule: 1 cap(s) orally once a day  Lipitor 40 mg oral tablet: 1 tab(s) orally once a day  loperamide 2 mg oral tablet: 1 tab(s) orally as needed for  diarrhea  losartan 50 mg oral tablet: 1 tab(s) orally once a day  metFORMIN 500 mg oral tablet: 1 tab(s) orally once a day  tadalafil 20 mg oral tablet: 1 tab(s) orally once a day PRN sexual activity   apixaban 5 mg oral tablet: 1 tab(s) orally 2 times a day Please take two tablets (10mg) twice a day for four days. Then take one tablet (5mg) twice a day.  aspirin 81 mg oral tablet: orally once a day  esomeprazole 20 mg oral delayed release capsule: 1 cap(s) orally once a day  Lipitor 40 mg oral tablet: 1 tab(s) orally once a day  loperamide 2 mg oral tablet: 1 tab(s) orally as needed for  diarrhea  losartan 50 mg oral tablet: 1 tab(s) orally once a day  metFORMIN 500 mg oral tablet: 1 tab(s) orally once a day  oxyCODONE 5 mg oral tablet: 1 tab(s) orally every 6 hours as needed for  moderate pain MDD: 4  tadalafil 20 mg oral tablet: 1 tab(s) orally once a day PRN sexual activity

## 2024-03-10 NOTE — DISCHARGE NOTE PROVIDER - NSFOLLOWUPCLINICS_GEN_ALL_ED_FT
[facility]  Hematology/Oncology  [address] [id number]  Phone: [phone]  Fax:

## 2024-03-10 NOTE — DISCHARGE NOTE PROVIDER - NSDCCPCAREPLAN_GEN_ALL_CORE_FT
PRINCIPAL DISCHARGE DIAGNOSIS  Diagnosis: Aortic thrombus  Assessment and Plan of Treatment: You were finf to have a blood coth in a big blood vasel, and aterosclaerosis plaque, is possible that pain is secondary to mini cloths traveling , we caonsulted cardica surgery and they recomend that you go home on a bllod thinner, plaese take blood thinner as prescribed, withought fail and follow up with cardiac surgery outaptient as scheduled.     PRINCIPAL DISCHARGE DIAGNOSIS  Diagnosis: Aortic thrombus  Assessment and Plan of Treatment: You were finf to have a blood coth in a big blood vasel, and aterosclaerosis plaque, is possible that pain is secondary to mini cloths traveling , we caonsulted cardica surgery and they recomend that you go home on a bllod thinner, plaese take blood thinner as prescribed, withought fail and follow up with cardiac surgery outaptient as scheduled.      SECONDARY DISCHARGE DIAGNOSES  Diagnosis: Abdominal pain  Assessment and Plan of Treatment: Abdominal pain is in setting of pancreatitis on CT, lipase have been normal , you requested to be on a diet , eat a fresh mediteranean diet and follow up with PCP in 1 week. Will give you pain medication for 1 week, after this you need to follow up with PCP.

## 2024-03-10 NOTE — DISCHARGE NOTE PROVIDER - CARE PROVIDER_API CALL
JEAN BERMUDEZ, [facility]  [address] [place] [address]  Phone: [phone]  Fax: [phone]  Follow Up Time:

## 2024-03-21 PROBLEM — I63.9 CEREBRAL INFARCTION, UNSPECIFIED: Chronic | Status: ACTIVE | Noted: 2024-03-08

## 2024-03-21 PROBLEM — I10 ESSENTIAL (PRIMARY) HYPERTENSION: Chronic | Status: ACTIVE | Noted: 2024-03-08

## 2024-03-21 PROBLEM — E78.5 HYPERLIPIDEMIA, UNSPECIFIED: Chronic | Status: ACTIVE | Noted: 2024-03-08

## 2024-03-21 PROBLEM — E11.9 TYPE 2 DIABETES MELLITUS WITHOUT COMPLICATIONS: Chronic | Status: ACTIVE | Noted: 2024-03-08

## 2024-04-02 ENCOUNTER — OUTPATIENT (OUTPATIENT)
Dept: OUTPATIENT SERVICES | Facility: HOSPITAL | Age: 74
LOS: 1 days | Discharge: HOME | End: 2024-04-02

## 2024-04-02 DIAGNOSIS — I74.10 EMBOLISM AND THROMBOSIS OF UNSPECIFIED PARTS OF AORTA: ICD-10-CM

## 2024-04-02 DIAGNOSIS — K85.90 ACUTE PANCREATITIS WITHOUT NECROSIS OR INFECTION, UNSPECIFIED: ICD-10-CM

## 2024-04-02 DIAGNOSIS — Z98.890 OTHER SPECIFIED POSTPROCEDURAL STATES: Chronic | ICD-10-CM

## 2024-04-11 ENCOUNTER — APPOINTMENT (OUTPATIENT)
Dept: HEMATOLOGY ONCOLOGY | Facility: CLINIC | Age: 74
End: 2024-04-11
Payer: MEDICARE

## 2024-04-11 ENCOUNTER — LABORATORY RESULT (OUTPATIENT)
Age: 74
End: 2024-04-11

## 2024-04-11 DIAGNOSIS — D68.59 OTHER PRIMARY THROMBOPHILIA: ICD-10-CM

## 2024-04-11 LAB
DEPRECATED D DIMER PPP IA-ACNC: <150 NG/ML DDU
HCYS SERPL-MCNC: 10.1 UMOL/L

## 2024-04-11 PROCEDURE — 99205 OFFICE O/P NEW HI 60 MIN: CPT

## 2024-04-11 NOTE — HISTORY OF PRESENT ILLNESS
[de-identified] : 74M, ex-heavy smoker, originally from [facility], Algerian-speaking mostly, PMHx HTN, HLD, TIA/ CVA, s/p appendectomy, varicose veins, s/p hernia repair, referred to rule out an underlying hypercoagulable state.   CASE SYNOPSIS: 3/8/2024 CT C/A/P:11 mm thrombus in the mid thoracic aortic arch. Mildly enlarged pancreatic tail with mild peripancreatic inflammatory change which may represent acute interstitial edematous pancreatitis.  Patient was admitted a month ago , between 3/8 and 3/10/2024 with increased chest and abdominal pain described as bilateral, constant, nonradiating, worse with deep breathing.  Symptom of dyspnea was actually reported a few weeks prior to presentation to the emergency room.  CT C/A/P showed an 11 mm thrombus in the mid thoracic aortic arch. Mildly enlarged pancreatic tail with mild peripancreatic inflammatory change which may represent acute interstitial edematous pancreatitis.  Cardiology workup showed no acute aortic syndrome such as dissection, ruptured, etc. and no evidence of malperfusion.  Abdominal discomfort was thought to be due to an embolic event.  Patient was started on anticoagulant (switched from heparin drip to Eliquis) and aspirin.  Patient is a retired small business owner, , has 3 children, smoked a pack a day for 30 years, discontinued 30 years ago, drinks alcohol occasionally, prefers beer.  Reports family history of diabetes (mother) and prostate cancer (brother).  Medication include apixaban, aspirin 81, esomeprazole, Lipitor, losartan, metformin, tadalafil.  He is here accompanied by his daughter, Ira, who helps with translation, per patient's wish.  [FreeTextEntry1] : Apixaban; Aspirin

## 2024-04-11 NOTE — CONSULT LETTER
[Consult Letter:] : I had the pleasure of evaluating your patient, [unfilled]. [Please see my note below.] : Please see my note below. [Consult Closing:] : Thank you very much for allowing me to participate in the care of this patient.  If you have any questions, please do not hesitate to contact me. [Sincerely,] : Sincerely, [Dear  ___] : Dear  [unfilled], [FreeTextEntry2] : Fran Bell MD [FreeTextEntry3] : ANAYELI VARELA M.D. Hematology/ Oncology [address] [address] Telephone: [phone] Fax: [phone]

## 2024-04-11 NOTE — ASSESSMENT
[FreeTextEntry1] : Mr. SOTOMAYOR 's questions were answered to his satisfaction.  He  expressed his  understanding and willingness to comply with the above recommendations, and  will return to the office in 6 months.

## 2024-04-12 LAB
AT III PPP CHRO-ACNC: 96 %
B2 GLYCOPROT1 IGA SERPL IA-ACNC: 5 SAU
B2 GLYCOPROT1 IGG SER-ACNC: <5 SGU
B2 GLYCOPROT1 IGM SER-ACNC: <5 SMU
CARDIOLIPIN IGM SER-MCNC: 5.5 GPL
CARDIOLIPIN IGM SER-MCNC: <5 MPL
PROT C PPP CHRO-ACNC: 99 %
PROT S AG ACT/NOR PPP IA: 83 %

## 2024-04-14 LAB — PROT S FREE PPP-ACNC: 90 %

## 2024-04-15 LAB
DNA PLOIDY SPEC FC-IMP: NORMAL
PTR INTERP: NORMAL

## 2024-05-12 ENCOUNTER — EMERGENCY (EMERGENCY)
Facility: HOSPITAL | Age: 74
LOS: 1 days | Discharge: HOME | End: 2024-05-12
Attending: STUDENT IN AN ORGANIZED HEALTH CARE EDUCATION/TRAINING PROGRAM | Admitting: STUDENT IN AN ORGANIZED HEALTH CARE EDUCATION/TRAINING PROGRAM
Payer: MEDICARE

## 2024-05-12 VITALS
DIASTOLIC BLOOD PRESSURE: 68 MMHG | OXYGEN SATURATION: 99 % | RESPIRATION RATE: 16 BRPM | HEART RATE: 56 BPM | SYSTOLIC BLOOD PRESSURE: 151 MMHG | TEMPERATURE: 99 F

## 2024-05-12 VITALS
TEMPERATURE: 98 F | OXYGEN SATURATION: 98 % | RESPIRATION RATE: 18 BRPM | HEART RATE: 54 BPM | DIASTOLIC BLOOD PRESSURE: 73 MMHG | SYSTOLIC BLOOD PRESSURE: 155 MMHG

## 2024-05-12 DIAGNOSIS — Z98.890 OTHER SPECIFIED POSTPROCEDURAL STATES: Chronic | ICD-10-CM

## 2024-05-12 LAB
ALBUMIN SERPL ELPH-MCNC: 4 G/DL — SIGNIFICANT CHANGE UP (ref 3.3–5)
ALP SERPL-CCNC: 88 U/L — SIGNIFICANT CHANGE UP (ref 40–120)
ALT FLD-CCNC: 18 U/L — SIGNIFICANT CHANGE UP (ref 4–41)
ANION GAP SERPL CALC-SCNC: 11 MMOL/L — SIGNIFICANT CHANGE UP (ref 7–14)
APTT BLD: 35.8 SEC — HIGH (ref 24.5–35.6)
AST SERPL-CCNC: 17 U/L — SIGNIFICANT CHANGE UP (ref 4–40)
BASOPHILS # BLD AUTO: 0 K/UL — SIGNIFICANT CHANGE UP (ref 0–0.2)
BASOPHILS NFR BLD AUTO: 0 % — SIGNIFICANT CHANGE UP (ref 0–2)
BILIRUB SERPL-MCNC: 0.5 MG/DL — SIGNIFICANT CHANGE UP (ref 0.2–1.2)
BUN SERPL-MCNC: 14 MG/DL — SIGNIFICANT CHANGE UP (ref 7–23)
CALCIUM SERPL-MCNC: 8.9 MG/DL — SIGNIFICANT CHANGE UP (ref 8.4–10.5)
CHLORIDE SERPL-SCNC: 103 MMOL/L — SIGNIFICANT CHANGE UP (ref 98–107)
CO2 SERPL-SCNC: 27 MMOL/L — SIGNIFICANT CHANGE UP (ref 22–31)
CREAT SERPL-MCNC: 1.07 MG/DL — SIGNIFICANT CHANGE UP (ref 0.5–1.3)
EGFR: 72 ML/MIN/1.73M2 — SIGNIFICANT CHANGE UP
EOSINOPHIL # BLD AUTO: 1.55 K/UL — HIGH (ref 0–0.5)
EOSINOPHIL NFR BLD AUTO: 20.5 % — HIGH (ref 0–6)
GLUCOSE SERPL-MCNC: 106 MG/DL — HIGH (ref 70–99)
HCT VFR BLD CALC: 43.3 % — SIGNIFICANT CHANGE UP (ref 39–50)
HGB BLD-MCNC: 15.2 G/DL — SIGNIFICANT CHANGE UP (ref 13–17)
IANC: 2.6 K/UL — SIGNIFICANT CHANGE UP (ref 1.8–7.4)
INR BLD: 1.16 RATIO — SIGNIFICANT CHANGE UP (ref 0.85–1.18)
LIDOCAIN IGE QN: 25 U/L — SIGNIFICANT CHANGE UP (ref 7–60)
LYMPHOCYTES # BLD AUTO: 2.57 K/UL — SIGNIFICANT CHANGE UP (ref 1–3.3)
LYMPHOCYTES # BLD AUTO: 33.9 % — SIGNIFICANT CHANGE UP (ref 13–44)
MAGNESIUM SERPL-MCNC: 2.1 MG/DL — SIGNIFICANT CHANGE UP (ref 1.6–2.6)
MCHC RBC-ENTMCNC: 33.7 PG — SIGNIFICANT CHANGE UP (ref 27–34)
MCHC RBC-ENTMCNC: 35.1 GM/DL — SIGNIFICANT CHANGE UP (ref 32–36)
MCV RBC AUTO: 96 FL — SIGNIFICANT CHANGE UP (ref 80–100)
MONOCYTES # BLD AUTO: 0.61 K/UL — SIGNIFICANT CHANGE UP (ref 0–0.9)
MONOCYTES NFR BLD AUTO: 8 % — SIGNIFICANT CHANGE UP (ref 2–14)
NEUTROPHILS # BLD AUTO: 2.3 K/UL — SIGNIFICANT CHANGE UP (ref 1.8–7.4)
NEUTROPHILS NFR BLD AUTO: 30.4 % — LOW (ref 43–77)
NT-PROBNP SERPL-SCNC: 117 PG/ML — SIGNIFICANT CHANGE UP
PLAT MORPH BLD: NORMAL — SIGNIFICANT CHANGE UP
PLATELET # BLD AUTO: 145 K/UL — LOW (ref 150–400)
PLATELET COUNT - ESTIMATE: ABNORMAL
POTASSIUM SERPL-MCNC: 4.1 MMOL/L — SIGNIFICANT CHANGE UP (ref 3.5–5.3)
POTASSIUM SERPL-SCNC: 4.1 MMOL/L — SIGNIFICANT CHANGE UP (ref 3.5–5.3)
PROT SERPL-MCNC: 7.1 G/DL — SIGNIFICANT CHANGE UP (ref 6–8.3)
PROTHROM AB SERPL-ACNC: 13.1 SEC — HIGH (ref 9.5–13)
RBC # BLD: 4.51 M/UL — SIGNIFICANT CHANGE UP (ref 4.2–5.8)
RBC # FLD: 12.4 % — SIGNIFICANT CHANGE UP (ref 10.3–14.5)
RBC BLD AUTO: NORMAL — SIGNIFICANT CHANGE UP
SMUDGE CELLS # BLD: PRESENT — SIGNIFICANT CHANGE UP
SODIUM SERPL-SCNC: 141 MMOL/L — SIGNIFICANT CHANGE UP (ref 135–145)
TROPONIN T, HIGH SENSITIVITY RESULT: 10 NG/L — SIGNIFICANT CHANGE UP
TROPONIN T, HIGH SENSITIVITY RESULT: 10 NG/L — SIGNIFICANT CHANGE UP
VARIANT LYMPHS # BLD: 7.2 % — HIGH (ref 0–6)
WBC # BLD: 7.58 K/UL — SIGNIFICANT CHANGE UP (ref 3.8–10.5)
WBC # FLD AUTO: 7.58 K/UL — SIGNIFICANT CHANGE UP (ref 3.8–10.5)

## 2024-05-12 PROCEDURE — 74174 CTA ABD&PLVS W/CONTRAST: CPT | Mod: 26,MC

## 2024-05-12 PROCEDURE — 71046 X-RAY EXAM CHEST 2 VIEWS: CPT | Mod: 26

## 2024-05-12 PROCEDURE — 99285 EMERGENCY DEPT VISIT HI MDM: CPT

## 2024-05-12 PROCEDURE — 93010 ELECTROCARDIOGRAM REPORT: CPT

## 2024-05-12 PROCEDURE — 71275 CT ANGIOGRAPHY CHEST: CPT | Mod: 26,MC

## 2024-05-12 NOTE — ED ADULT NURSE NOTE - OBJECTIVE STATEMENT
Break RN Note- Patient arrives to the ED to have his pancreas and heart check out. Patient reports he was recently admitted for a clot and on heparin. Patient reports the pain is persisting and follow up was inconclusive so he would like to be seen here. Patient breathing even and nonlabored. No acute distress. Denies any SOB, n/v, or other complaints. 18g IV placed to left arm. Labs sent as ordered. Awaiting imaging. Safety maintained. Patient stable upon exiting the room.

## 2024-05-12 NOTE — ED ADULT NURSE NOTE - NSFALLHARMRISKINTERV_ED_ALL_ED

## 2024-05-12 NOTE — ED ADULT TRIAGE NOTE - CHIEF COMPLAINT QUOTE
c/o chest pain and LLQ abd pain onset 3 month ago reports was seen fo the same complaint  but pain persists since that time, pt states " I have pancreas pain again". Phx of HTN, DM., CVA

## 2024-05-12 NOTE — ED PROVIDER NOTE - ATTENDING CONTRIBUTION TO CARE
73 yo M PMH HTN, HLD, DM2, CVA recnt admission for chest and abd pain found to have aortic thrombuc and started on eliquis. pt states he has chronic chest and abd pain but had worsened over last 3 days, has been seen by his dr but does not get answers why he is having this pain  pt has been taking his eliquis  ekg sinus daylin  wioll check labs, trop cts, reassess

## 2024-05-12 NOTE — ED PROVIDER NOTE - NSPTACCESSSVCSAPPTDETAILS_ED_ALL_ED_FT
for chest pain evaluation, has thrombus in aorta; also needs cardiology f/u and GI follow up for abdominal pain for chest pain evaluation, has thrombus in aorta; also needs cardiology f/u and GI follow up for abdominal pain; patient Indonesian-speaking

## 2024-05-12 NOTE — ED PROVIDER NOTE - OBJECTIVE STATEMENT
Patient is a 73 year-old-male with history of T2DM, HTN, HLD, CVA and recent diagnosis of thoracic aortic arch thrombus presents with persistent intermittent chest pain and abdominal pain. Reports that since discharge around March, he has been having persistent but intermittent pain in the chest and abdomen; this episode started last night and worsen today and thus presented here. +vomiting 3 days ago, +SOB. Denies fever.

## 2024-05-12 NOTE — ED PROVIDER NOTE - PROGRESS NOTE DETAILS
CTs nonactionable as pt w/repeat aortic thrombus already on eliquis, will DCTH w/cardiology, GI, and cardiothoracic surg f/u. - Radha Neumann, PGY-3

## 2024-05-12 NOTE — ED PROVIDER NOTE - PHYSICAL EXAMINATION
Vitals: I have reviewed the patients vital signs  General: Well dressed, well appearing, no acute distress  HEENT: Atraumatic, normocephalic, airway patent  Eyes: EOMI, tracking appropriately  Neck: no tracheal deviation, no JVD  Chest/Lungs: symmetric chest rise, speaking in complete sentences, no WOB  Heart: skin and extremities well perfused, regular rate and rhythm, no LE edema/calf tenderness   Abdomen: soft, nontender and nondistended   Neuro: A+Ox3, ambulating without difficulty, CN grossly intact  MSK: strength at baseline in all extremities, no muscle wasting or atrophy  Skin: no cyanosis, no jaundice, no new emergent lesions

## 2024-05-12 NOTE — ED PROVIDER NOTE - PATIENT PORTAL LINK FT
You can access the FollowMyHealth Patient Portal offered by [facility] by registering at the following website: [email or web address]. By joining [employer]’s FollowMyHealth portal, you will also be able to view your health information using other applications (apps) compatible with our system.

## 2024-05-12 NOTE — ED ADULT NURSE NOTE - NSFALLASSESSNEED_ED_ALL_ED
Physical Therapy  Visit Type: treatment and discharge  Precautions:  Medical precautions: ; standard precautions.   Lines:     Basic: capped IV and telemetry      Lines in chart and on patient reviewed, precautions maintained throughout session.  Hearing: no hearing deficits    SUBJECTIVE  Patient agreed to participate in therapy this date.  Patient states he's going home today.  Patient / Family Goal: return home     OBJECTIVE     Oriented to person, place, time and situation     Arousal alertness: appropriate responses to stimuli  Patient activity tolerance: 1 to 1 activity to rest  Functional Communication/Cognition    Overall status:  Within functional limits    Bed Mobility:        Supine to sit: independent    Sit to supine: Up in chair end of session.  Training completed:    Tasks: supine to sit    Education details: patient safety and patient demonstrates understanding  Transfers:    Assistive devices: gait belt, none and 1 person    Sit to stand: independent    Stand to sit: independent    Stand pivot: independent    Toilet: independent  Training completed:    Tasks: sit to stand, stand to sit, stand pivot and toilet    Education details: body mechanics, patient safety and patient demonstrates understanding    Patient steady without LOB and without device.  Gait/Ambulation:     Assistance: independent   Assistive device: gait belt, none and 1 person    Distance (ft): 350    Pattern: within functional limits  Training Completed:    Tasks: gait training on level surfaces    Education details: body mechanics, patient safety and patient demonstrates understanding    Patient steady without LOB, no device, on straight path and with turns.  Stair Mobility:    Number of steps: 10;     Assistance: modified independent (Pt has 10 steps to access apartment.)    Rails: right rail only    Pattern: reciprocal    Devices used: gait belt  Training completed:    Tasks: stair training    Education details: body mechanics,  patient safety and patient demonstrates understanding    Patient steady and safe on stairs.      Interventions     Skilled input: Verbal instruction/cues and as detailed above       ASSESSMENT     54 year old male patient seen in ICU. Today's treatment focused on activity progression, functional mobility skills, gait training and stair negotiation . The patient is demonstrating good progress as evidenced by today's function noted in objective section. Patient has progressed well and met goals of independent with mobility, transfers and ambulation on level surface and stairs. Will discharge from PT. No further needs.    Patient has been counseled this session on the current discharge recommendation. Patient is in agreement with recommendations.    Collaborated with JAYDA Cifuentes regarding discharge recommendations, patient's status, mobility recommendations, safety recommendations and plan to discharge from PT.         Discharge Recommendations  Recommendation for Discharge Location: PT WI: Home without therapy needs               PT/OT Mobility Equipment for Discharge: none needed      PT Identified Barriers to Discharge: medical issues     Progress: goals met     • Skilled therapy is not required due to Pt met goals of independent with mobility.    Education Provided:   Learning Assessment:  - Primary learner: patient  - Are they ready to learn: yes  - Preferred learning style: verbal  - Barriers to learning: no barriers apparent at this time  Education provided during session:  - Receiving Education: patient  - Results of above outlined education: Verbalizes understanding and Demonstrates understanding    Patient at End of Session:   Location: in chair  Safety measures: call light within reach  Handoff to: nurse    PLAN   Suggestions for next session as indicated: Discontinue acute level PT at this time. The patient has reached maximal functional capacity at this stage in their recovery, no further PT needs at this  time.     Interventions: endurance training, functional transfer training, stairs retraining, gait training, patient/family training and safety education  Agreement to plan and goals: patient agrees with goals and treatment plan        GOALS  Review Date: 8/31/2022  Long Term Goals: (to be met by time of discharge from hospital)  Stand pivot: Patient will complete stand pivot transfer with no device, independent.   Status: met   Ambulation (even): Patient will ambulate on even surface for 300 feet with no device, independent.   Status: met   Flight of stairs: Patient will ambulate a flight of stairs with using 1 rail, modified independent and supervision.  Status: met     Documented in the chart in the following areas: Pain. Assessment.      Therapy procedure time and total treatment time can be found documented on the Time Entry flowsheet   no

## 2024-05-12 NOTE — ED PROVIDER NOTE - NSFOLLOWUPINSTRUCTIONS_ED_ALL_ED_FT
You were seen and evaluated in the Emergency Department for your chest and abdominal pain. You were evaluated clinically and with laboratory studies.    At this time your clinical evaluation and history do not demonstrate any acute, life-threatening medical conditions warranting emergent treatment. However, we strongly recommend you follow up with one of our GI consultants (or your own) for further evaluation of your symptoms by calling the following number to make an appointment:    [facility]  Gastroenterology  [address] [place] [address], [address]  [place] [address]  Phone: [phone]    Should you develop new or worsening abdominal pain, fevers, chills, nausea, vomiting, diarrhea, or constipation - please return to the ED for immediate evaluation.     We also strongly encourage you make an appointment with your Primary Care Physician for a comprehensive evaluation of your health.

## 2024-05-12 NOTE — ED PROVIDER NOTE - CLINICAL SUMMARY MEDICAL DECISION MAKING FREE TEXT BOX
Patient is a 73 year-old-male with history of T2DM, HTN, HLD, CVA and recent diagnosis of thoracic aortic arch thrombus presents with persistent intermittent chest pain and abdominal pain. Reports that since discharge around March, he has been having persistent but intermittent pain in the chest and abdomen; this episode started last night and worsen today and thus presented here. +vomiting 3 days ago, +SOB. Denies fever. Patient does not appear to be in acute distress. DDx includes but not limited to worsening clot burden vs ACS vs PE vs PNA. Workup includes labs, ECG, CXR and CTA aorta.

## 2024-05-12 NOTE — ED ADULT NURSE REASSESSMENT NOTE - NS ED NURSE REASSESS COMMENT FT1
Report received from day ABBIE Marti. Pt A&ox4, ambulatory, on room air coming to ED c/o chest pain. Pt with no acute changes at this time. NSR on cardiac monitor. Pt respirations even and unlabored, chest rise and fall equal b/l. Pt denies chest pain, HA, SOB, dizziness, N/V/D, fever/chills. Pt pending dispo. Stretcher in lowest position, pt safety maintained.

## 2024-05-23 ENCOUNTER — APPOINTMENT (OUTPATIENT)
Dept: CARDIOTHORACIC SURGERY | Facility: CLINIC | Age: 74
End: 2024-05-23
Payer: MEDICARE

## 2024-05-23 VITALS
RESPIRATION RATE: 14 BRPM | DIASTOLIC BLOOD PRESSURE: 64 MMHG | BODY MASS INDEX: 21.17 KG/M2 | WEIGHT: 124 LBS | OXYGEN SATURATION: 98 % | HEART RATE: 55 BPM | HEIGHT: 64 IN | TEMPERATURE: 98.4 F | SYSTOLIC BLOOD PRESSURE: 111 MMHG

## 2024-05-23 DIAGNOSIS — I71.00 DISSECTION OF UNSPECIFIED SITE OF AORTA: ICD-10-CM

## 2024-05-23 DIAGNOSIS — I10 ESSENTIAL (PRIMARY) HYPERTENSION: ICD-10-CM

## 2024-05-23 PROCEDURE — 99214 OFFICE O/P EST MOD 30 MIN: CPT

## 2024-05-23 RX ORDER — BLOOD-GLUCOSE METER
W/DEVICE KIT MISCELLANEOUS
Qty: 1 | Refills: 0 | Status: COMPLETED | COMMUNITY
Start: 2022-07-20 | End: 2024-05-23

## 2024-05-23 RX ORDER — APIXABAN 5 MG/1
5 TABLET, FILM COATED ORAL
Qty: 20 | Refills: 0 | Status: ACTIVE | COMMUNITY
Start: 2024-05-23

## 2024-05-23 RX ORDER — TADALAFIL 20 MG/1
20 TABLET ORAL
Refills: 0 | Status: ACTIVE | COMMUNITY
Start: 2024-05-23

## 2024-05-23 RX ORDER — CEFPODOXIME PROXETIL 100 MG/1
100 TABLET, FILM COATED ORAL
Qty: 14 | Refills: 0 | Status: COMPLETED | COMMUNITY
Start: 2022-05-03 | End: 2024-05-23

## 2024-05-23 RX ORDER — LANCETS 28 GAUGE
EACH MISCELLANEOUS
Qty: 100 | Refills: 0 | Status: COMPLETED | COMMUNITY
Start: 2022-07-20 | End: 2024-05-23

## 2024-05-23 RX ORDER — LOSARTAN POTASSIUM 50 MG/1
50 TABLET, FILM COATED ORAL DAILY
Qty: 30 | Refills: 5 | Status: ACTIVE | COMMUNITY
Start: 2022-07-20

## 2024-05-23 RX ORDER — BLOOD SUGAR DIAGNOSTIC
STRIP MISCELLANEOUS
Qty: 50 | Refills: 0 | Status: COMPLETED | COMMUNITY
Start: 2022-07-20 | End: 2024-05-23

## 2024-05-23 RX ORDER — ISOPROPYL ALCOHOL 70 ML/100ML
70 SWAB TOPICAL
Qty: 100 | Refills: 0 | Status: COMPLETED | COMMUNITY
Start: 2022-07-20 | End: 2024-05-23

## 2024-05-23 RX ORDER — METRONIDAZOLE 500 MG/1
500 TABLET ORAL
Qty: 21 | Refills: 0 | Status: COMPLETED | COMMUNITY
Start: 2022-05-03 | End: 2024-05-23

## 2024-05-23 RX ORDER — ASPIRIN ENTERIC COATED TABLETS 81 MG 81 MG/1
81 TABLET, DELAYED RELEASE ORAL DAILY
Qty: 60 | Refills: 0 | Status: ACTIVE | COMMUNITY
Start: 2024-05-23

## 2024-05-23 RX ORDER — ATORVASTATIN CALCIUM 40 MG/1
40 TABLET, FILM COATED ORAL
Qty: 30 | Refills: 0 | Status: ACTIVE | COMMUNITY
Start: 2022-07-20

## 2024-05-23 RX ORDER — ESOMEPRAZOLE MAGNESIUM 20 MG/1
20 CAPSULE, DELAYED RELEASE ORAL DAILY
Refills: 0 | Status: ACTIVE | COMMUNITY
Start: 2022-07-20

## 2024-05-23 RX ORDER — METFORMIN HYDROCHLORIDE 500 MG/1
500 TABLET, COATED ORAL
Qty: 120 | Refills: 0 | Status: ACTIVE | COMMUNITY
Start: 2024-05-23

## 2024-05-24 PROBLEM — I71.00 INTRAMURAL AORTIC HEMATOMA: Status: ACTIVE | Noted: 2024-05-24

## 2024-05-24 PROBLEM — I10 BENIGN HYPERTENSION: Status: ACTIVE | Noted: 2024-05-24

## 2024-05-24 NOTE — REVIEW OF SYSTEMS
[Chest Pain] : chest pain [Palpitations] : palpitations [Shortness Of Breath] : shortness of breath [SOB on Exertion] : shortness of breath during exertion [Negative] : Heme/Lymph [FreeTextEntry4] : Swallowing problems
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
## 2024-05-24 NOTE — CONSULT LETTER
[Dear  ___] : Dear  [unfilled], [Courtesy Letter:] : I had the pleasure of seeing your patient, [unfilled], in my office today. [Please see my note below.] : Please see my note below. [Consult Closing:] : Thank you very much for allowing me to participate in the care of this patient.  If you have any questions, please do not hesitate to contact me. [Sincerely,] : Sincerely, [FreeTextEntry3] :  Dr. Valentin Lipscomb Attending Surgeon Department of Cardiovascular and Thoracic surgery [address] [place] [address] Tel: [phone]

## 2024-05-24 NOTE — DATA REVIEWED
[FreeTextEntry1] : 5/12/24 CTA Chest revealed No thoracic aortic aneurysm or acute dissection. 1.5 cm  intraluminal thrombus again noted in the proximal descending aorta. Branches of the aortic arch are patent. No pulmonary emboli.  5/8/24 CTA Chest revealed An 11 x 10 mm rounded filling defect is seen in the mid aortic arch compatible with thrombus. Normal caliber aorta. No aortic dissection. Atherosclerotic calcifications at the arch and in the coronary arteries.

## 2024-05-24 NOTE — PHYSICAL EXAM
[Sclera] : the sclera and conjunctiva were normal [PERRL With Normal Accommodation] : pupils were equal in size, round, and reactive to light [Neck Appearance] : the appearance of the neck was normal [] : no respiratory distress [Respiration, Rhythm And Depth] : normal respiratory rhythm and effort [Auscultation Breath Sounds / Voice Sounds] : lungs were clear to auscultation bilaterally [Apical Impulse] : the apical impulse was normal [Heart Rate And Rhythm] : heart rate was normal and rhythm regular [Heart Sounds] : normal S1 and S2 [Murmurs] : no murmurs [Examination Of The Chest] : the chest was normal in appearance [2+] : left 2+ [Breast Appearance] : normal in appearance [Bowel Sounds] : normal bowel sounds [FreeTextEntry1] : Deferred [Abdomen Soft] : soft [No CVA Tenderness] : no ~M costovertebral angle tenderness [Abnormal Walk] : normal gait [Involuntary Movements] : no involuntary movements were seen [Skin Color & Pigmentation] : normal skin color and pigmentation [Skin Turgor] : normal skin turgor [No Focal Deficits] : no focal deficits [Oriented To Time, Place, And Person] : oriented to person, place, and time [Impaired Insight] : insight and judgment were intact [Affect] : the affect was normal [Mood] : the mood was normal [Memory Recent] : recent memory was not impaired [Memory Remote] : remote memory was not impaired
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
## 2025-02-27 ENCOUNTER — NON-APPOINTMENT (OUTPATIENT)
Age: 75
End: 2025-02-27

## 2025-02-27 ENCOUNTER — APPOINTMENT (OUTPATIENT)
Dept: CARDIOLOGY | Facility: CLINIC | Age: 75
End: 2025-02-27
Payer: MEDICARE

## 2025-02-27 VITALS
OXYGEN SATURATION: 97 % | HEIGHT: 64 IN | HEART RATE: 61 BPM | DIASTOLIC BLOOD PRESSURE: 73 MMHG | BODY MASS INDEX: 21.17 KG/M2 | SYSTOLIC BLOOD PRESSURE: 167 MMHG | WEIGHT: 124 LBS

## 2025-02-27 DIAGNOSIS — Z79.01 ENCOUNTER FOR THERAPEUTIC DRUG LVL MONITORING: ICD-10-CM

## 2025-02-27 DIAGNOSIS — I10 ESSENTIAL (PRIMARY) HYPERTENSION: ICD-10-CM

## 2025-02-27 DIAGNOSIS — Z71.89 OTHER SPECIFIED COUNSELING: ICD-10-CM

## 2025-02-27 DIAGNOSIS — Z51.81 ENCOUNTER FOR THERAPEUTIC DRUG LVL MONITORING: ICD-10-CM

## 2025-02-27 DIAGNOSIS — I71.00 DISSECTION OF UNSPECIFIED SITE OF AORTA: ICD-10-CM

## 2025-02-27 DIAGNOSIS — D68.59 OTHER PRIMARY THROMBOPHILIA: ICD-10-CM

## 2025-02-27 PROCEDURE — 93000 ELECTROCARDIOGRAM COMPLETE: CPT

## 2025-02-27 PROCEDURE — 99204 OFFICE O/P NEW MOD 45 MIN: CPT | Mod: 25

## 2025-03-04 ENCOUNTER — OUTPATIENT (OUTPATIENT)
Dept: OUTPATIENT SERVICES | Facility: HOSPITAL | Age: 75
LOS: 1 days | End: 2025-03-04
Payer: COMMERCIAL

## 2025-03-04 ENCOUNTER — APPOINTMENT (OUTPATIENT)
Dept: CT IMAGING | Facility: IMAGING CENTER | Age: 75
End: 2025-03-04
Payer: MEDICARE

## 2025-03-04 DIAGNOSIS — Z98.890 OTHER SPECIFIED POSTPROCEDURAL STATES: Chronic | ICD-10-CM

## 2025-03-04 DIAGNOSIS — I71.00 DISSECTION OF UNSPECIFIED SITE OF AORTA: ICD-10-CM

## 2025-03-04 PROCEDURE — 71275 CT ANGIOGRAPHY CHEST: CPT | Mod: 26

## 2025-03-04 PROCEDURE — 71275 CT ANGIOGRAPHY CHEST: CPT

## 2025-03-05 ENCOUNTER — APPOINTMENT (OUTPATIENT)
Dept: CARDIOLOGY | Facility: CLINIC | Age: 75
End: 2025-03-05
Payer: MEDICARE

## 2025-03-05 PROCEDURE — 93880 EXTRACRANIAL BILAT STUDY: CPT

## 2025-03-05 PROCEDURE — 93306 TTE W/DOPPLER COMPLETE: CPT

## 2025-03-05 PROCEDURE — 93970 EXTREMITY STUDY: CPT

## 2025-04-08 ENCOUNTER — APPOINTMENT (OUTPATIENT)
Dept: CV DIAGNOSITCS | Facility: HOSPITAL | Age: 75
End: 2025-04-08

## 2025-06-05 ENCOUNTER — APPOINTMENT (OUTPATIENT)
Dept: CARDIOTHORACIC SURGERY | Facility: CLINIC | Age: 75
End: 2025-06-05
Payer: MEDICARE

## 2025-06-05 DIAGNOSIS — I10 ESSENTIAL (PRIMARY) HYPERTENSION: ICD-10-CM

## 2025-06-05 DIAGNOSIS — I71.00 DISSECTION OF UNSPECIFIED SITE OF AORTA: ICD-10-CM

## 2025-06-05 PROCEDURE — 99204 OFFICE O/P NEW MOD 45 MIN: CPT
